# Patient Record
Sex: MALE | Race: WHITE | Employment: OTHER | URBAN - METROPOLITAN AREA
[De-identification: names, ages, dates, MRNs, and addresses within clinical notes are randomized per-mention and may not be internally consistent; named-entity substitution may affect disease eponyms.]

---

## 2017-02-07 ENCOUNTER — OFFICE VISIT (OUTPATIENT)
Dept: FAMILY MEDICINE CLINIC | Age: 67
End: 2017-02-07

## 2017-02-07 VITALS
TEMPERATURE: 100.7 F | OXYGEN SATURATION: 93 % | SYSTOLIC BLOOD PRESSURE: 132 MMHG | DIASTOLIC BLOOD PRESSURE: 82 MMHG | HEART RATE: 74 BPM | WEIGHT: 230 LBS | BODY MASS INDEX: 34.07 KG/M2 | HEIGHT: 69 IN

## 2017-02-07 DIAGNOSIS — J40 BRONCHITIS: ICD-10-CM

## 2017-02-07 DIAGNOSIS — J02.9 PHARYNGITIS, UNSPECIFIED ETIOLOGY: Primary | ICD-10-CM

## 2017-02-07 LAB
INFLUENZA A ANTIBODY: NORMAL
INFLUENZA B ANTIBODY: NORMAL
S PYO AG THROAT QL: NORMAL

## 2017-02-07 PROCEDURE — 87804 INFLUENZA ASSAY W/OPTIC: CPT | Performed by: FAMILY MEDICINE

## 2017-02-07 PROCEDURE — 87880 STREP A ASSAY W/OPTIC: CPT | Performed by: FAMILY MEDICINE

## 2017-02-07 PROCEDURE — 99213 OFFICE O/P EST LOW 20 MIN: CPT | Performed by: FAMILY MEDICINE

## 2017-02-07 RX ORDER — BENZONATATE 200 MG/1
200 CAPSULE ORAL 3 TIMES DAILY PRN
Qty: 30 CAPSULE | Refills: 1 | Status: SHIPPED | OUTPATIENT
Start: 2017-02-07 | End: 2017-03-10 | Stop reason: ALTCHOICE

## 2017-02-07 RX ORDER — AZITHROMYCIN 250 MG/1
TABLET, FILM COATED ORAL
Qty: 1 PACKET | Refills: 0 | Status: SHIPPED | OUTPATIENT
Start: 2017-02-07 | End: 2017-03-10 | Stop reason: ALTCHOICE

## 2017-02-07 ASSESSMENT — ENCOUNTER SYMPTOMS
SORE THROAT: 1
DIARRHEA: 1
COUGH: 1
WHEEZING: 0

## 2017-03-10 ENCOUNTER — OFFICE VISIT (OUTPATIENT)
Dept: FAMILY MEDICINE CLINIC | Age: 67
End: 2017-03-10

## 2017-03-10 VITALS
DIASTOLIC BLOOD PRESSURE: 82 MMHG | WEIGHT: 230 LBS | BODY MASS INDEX: 34.86 KG/M2 | SYSTOLIC BLOOD PRESSURE: 130 MMHG | HEART RATE: 74 BPM | OXYGEN SATURATION: 97 % | HEIGHT: 68 IN

## 2017-03-10 DIAGNOSIS — Z12.11 SCREENING FOR COLORECTAL CANCER: ICD-10-CM

## 2017-03-10 DIAGNOSIS — Z12.12 SCREENING FOR COLORECTAL CANCER: ICD-10-CM

## 2017-03-10 DIAGNOSIS — Z00.00 ROUTINE GENERAL MEDICAL EXAMINATION AT A HEALTH CARE FACILITY: Primary | ICD-10-CM

## 2017-03-10 PROCEDURE — 82274 ASSAY TEST FOR BLOOD FECAL: CPT | Performed by: FAMILY MEDICINE

## 2017-03-10 PROCEDURE — G0438 PPPS, INITIAL VISIT: HCPCS | Performed by: FAMILY MEDICINE

## 2017-03-10 ASSESSMENT — LIFESTYLE VARIABLES: HOW OFTEN DO YOU HAVE A DRINK CONTAINING ALCOHOL: 0

## 2017-03-10 ASSESSMENT — ANXIETY QUESTIONNAIRES: GAD7 TOTAL SCORE: 0

## 2017-03-10 ASSESSMENT — PATIENT HEALTH QUESTIONNAIRE - PHQ9: SUM OF ALL RESPONSES TO PHQ QUESTIONS 1-9: 0

## 2017-06-23 ENCOUNTER — TELEPHONE (OUTPATIENT)
Dept: OTHER | Facility: CLINIC | Age: 67
End: 2017-06-23

## 2017-06-30 ENCOUNTER — TELEPHONE (OUTPATIENT)
Dept: FAMILY MEDICINE CLINIC | Age: 67
End: 2017-06-30

## 2017-06-30 DIAGNOSIS — H57.89 CYST OF EYE: ICD-10-CM

## 2017-06-30 DIAGNOSIS — H26.9 CATARACT OF BOTH EYES: Primary | ICD-10-CM

## 2017-07-25 ENCOUNTER — TELEPHONE (OUTPATIENT)
Dept: FAMILY MEDICINE CLINIC | Age: 67
End: 2017-07-25

## 2017-07-25 NOTE — TELEPHONE ENCOUNTER
Leo Tejeda   1950    Is requesting a referral to see the following specialist:     Name of provider / Doctor: Reddjaclyn Muñoz  Phone. [phone]  Fax. [phone]  Specialty: opthamology  NPI #  If available: [id number]  TAX ID# If available: [id number]  Specialist appointment scheduled (DOS)? 6-  Primary reason / diagnosis (ICD. 10code) for the appointment: Z01.00 Routine Eye exam  Primary Insurance:    (CONFIRM insurance)

## 2017-09-05 ENCOUNTER — TELEPHONE (OUTPATIENT)
Dept: OTHER | Facility: CLINIC | Age: 67
End: 2017-09-05

## 2017-10-09 ENCOUNTER — OFFICE VISIT (OUTPATIENT)
Dept: FAMILY MEDICINE CLINIC | Age: 67
End: 2017-10-09

## 2017-10-09 VITALS
DIASTOLIC BLOOD PRESSURE: 82 MMHG | SYSTOLIC BLOOD PRESSURE: 120 MMHG | OXYGEN SATURATION: 97 % | TEMPERATURE: 99.3 F | HEART RATE: 87 BPM | BODY MASS INDEX: 35.91 KG/M2 | WEIGHT: 236.2 LBS

## 2017-10-09 DIAGNOSIS — R31.9 HEMATURIA: ICD-10-CM

## 2017-10-09 DIAGNOSIS — Z12.5 ENCOUNTER FOR SCREENING FOR MALIGNANT NEOPLASM OF PROSTATE: ICD-10-CM

## 2017-10-09 DIAGNOSIS — R33.9 URINARY RETENTION: ICD-10-CM

## 2017-10-09 DIAGNOSIS — R39.9 URINARY TRACT INFECTION SYMPTOMS: Primary | ICD-10-CM

## 2017-10-09 LAB
BILIRUBIN, POC: NORMAL
BLOOD URINE, POC: NORMAL
CLARITY, POC: CLEAR
COLOR, POC: NORMAL
GLUCOSE URINE, POC: NORMAL
HCT VFR BLD CALC: 42.4 % (ref 40.5–52.5)
HEMOGLOBIN: 14.3 G/DL (ref 13.5–17.5)
KETONES, POC: 15
LEUKOCYTE EST, POC: NORMAL
MCH RBC QN AUTO: 29.7 PG (ref 26–34)
MCHC RBC AUTO-ENTMCNC: 33.6 G/DL (ref 31–36)
MCV RBC AUTO: 88.3 FL (ref 80–100)
NITRITE, POC: POSITIVE
PDW BLD-RTO: 14.4 % (ref 12.4–15.4)
PH, POC: 6
PLATELET # BLD: 197 K/UL (ref 135–450)
PMV BLD AUTO: 8.9 FL (ref 5–10.5)
PROTEIN, POC: 100
RBC # BLD: 4.81 M/UL (ref 4.2–5.9)
SPECIFIC GRAVITY, POC: 1.03
UROBILINOGEN, POC: 2
WBC # BLD: 7.4 K/UL (ref 4–11)

## 2017-10-09 PROCEDURE — 99213 OFFICE O/P EST LOW 20 MIN: CPT | Performed by: NURSE PRACTITIONER

## 2017-10-09 PROCEDURE — 81002 URINALYSIS NONAUTO W/O SCOPE: CPT | Performed by: NURSE PRACTITIONER

## 2017-10-09 RX ORDER — TAMSULOSIN HYDROCHLORIDE 0.4 MG/1
0.4 CAPSULE ORAL DAILY
Qty: 14 CAPSULE | Refills: 0 | Status: SHIPPED | OUTPATIENT
Start: 2017-10-09 | End: 2019-05-16 | Stop reason: ALTCHOICE

## 2017-10-09 RX ORDER — PHENAZOPYRIDINE HYDROCHLORIDE 200 MG/1
200 TABLET, FILM COATED ORAL 3 TIMES DAILY PRN
COMMUNITY
Start: 2017-10-06 | End: 2017-10-09 | Stop reason: ALTCHOICE

## 2017-10-09 RX ORDER — SULFAMETHOXAZOLE AND TRIMETHOPRIM 800; 160 MG/1; MG/1
1 TABLET ORAL 2 TIMES DAILY
COMMUNITY
Start: 2017-10-06 | End: 2017-10-09 | Stop reason: ALTCHOICE

## 2017-10-09 RX ORDER — CIPROFLOXACIN 500 MG/1
500 TABLET, FILM COATED ORAL 2 TIMES DAILY
Qty: 14 TABLET | Refills: 0 | Status: SHIPPED | OUTPATIENT
Start: 2017-10-09 | End: 2017-10-16

## 2017-10-09 ASSESSMENT — ENCOUNTER SYMPTOMS
BACK PAIN: 0
DIARRHEA: 1
VOMITING: 1
NAUSEA: 1

## 2017-10-09 NOTE — PATIENT INSTRUCTIONS
Stop bactrim   Start Cipro twice daily for 7 days, if you have any side effects stop taking and call  Office  Start taking Flomax nightly  Increase oral fluids especially water    Patient Education        Urinary Retention: Care Instructions  Your Care Instructions    Urinary retention means that you aren't able to urinate. In men, it is often caused by a blockage of the urinary tract from an enlarged prostate gland. In men and women, it can also be caused by an infection or nerve damage. Or it may be a side effect of a medicine. The doctor may have drained the urine from your bladder. If you still have problems passing urine, you may need to use a catheter at home. This is used to empty your bladder until the problem can be fixed. Your doctor may put a catheter in your bladder before you go home. If so, he or she will tell you when to come back to have the catheter removed. The doctor has checked you closely. But problems can develop later. If you notice any problems or new symptoms, get medical treatment right away. Follow-up care is a key part of your treatment and safety. Be sure to make and go to all appointments, and call your doctor if you are having problems. It's also a good idea to know your test results and keep a list of the medicines you take. How can you care for yourself at home? · Take your medicines exactly as prescribed. Call your doctor if you think you are having a problem with your medicine. You will get more details on the specific medicines your doctor prescribes. · Check with your doctor before you use any over-the-counter medicines. Many cold and allergy medicines, for example, can make this problem worse. Make sure your doctor knows all of the medicines, vitamins, supplements, and herbal remedies you take. · Spread out through the day the amount of fluid you drink. Do not drink a lot at bedtime. · Avoid alcohol and caffeine.   · If you have been given a catheter, or if one is already in place, follow the instructions you were given. Always wash your hands before and after you handle the catheter. When should you call for help? Call your doctor now or seek immediate medical care if:  · You cannot urinate at all, or it is getting harder to urinate. · You have symptoms of a urinary tract infection. These may include:  ¨ Pain or burning when you urinate. ¨ A frequent need to urinate without being able to pass much urine. ¨ Pain in the flank, which is just below the rib cage and above the waist on either side of the back. ¨ Blood in your urine. ¨ A fever. Watch closely for changes in your health, and be sure to contact your doctor if:  · You have any problems with your catheter. · You do not get better as expected. Where can you learn more? Go to https://Av[employer]ink.[employer]. org and sign in to your [employer] account. Enter M244 in the [employer] box to learn more about \"Urinary Retention: Care Instructions. \"     If you do not have an account, please click on the \"Sign Up Now\" link. Current as of: August 12, 2016  Content Version: 11.3  © 6116-5909 [employer]. Care instructions adapted under license by [facility] ([facility]). If you have questions about a medical condition or this instruction, always ask your healthcare professional. [place] [address] any warranty or liability for your use of this information. Patient Education        Urinary Tract Infections in Men: Care Instructions  Your Care Instructions    A urinary tract infection, or UTI, is a general term for an infection anywhere between the kidneys and the tip of the penis. UTIs can also be a result of a prostate problem. Most cause pain or burning when you urinate. Most UTIs are caused by bacteria and can be cured with antibiotics. It is important to complete your treatment so that the infection does not get worse. Follow-up care is a key part of your treatment and safety.  Be sure swollen, red, or tender skin, or pus around the catheter. · Clean the area around the catheter with soap and water two times a day. Dry with a clean towel afterward. · Do not apply powder or lotion to the skin around the catheter. To empty the urine collection bag  · Wash your hands with soap and water. · Without touching the drain spout, remove the spout from its sleeve at the bottom of the collection bag. Open the valve on the spout. · Let the urine flow out of the bag and into the toilet or a container. Do not let the tubing or drain spout touch anything. · After you empty the bag, clean the end of the drain spout with tissue and water. Close the valve and put the drain spout back into its sleeve at the bottom of the collection bag. · Wash your hands with soap and water. When should you call for help? Call your doctor now or seek immediate medical care if:  · Symptoms such as a fever, chills, nausea, or vomiting get worse or happen for the first time. · You have new pain in your back just below your rib cage. This is called flank pain. · There is new blood or pus in your urine. · You are not able to take or keep down your antibiotics. Watch closely for changes in your health, and be sure to contact your doctor if:  · You are not getting better after taking an antibiotic for 2 days. · Your symptoms go away but then come back. Where can you learn more? Go to https://[email or web address] org and sign in to your [employer] account. Enter F442 in the [facility] box to learn more about \"Urinary Tract Infections in Men: Care Instructions. \"     If you do not have an account, please click on the \"Sign Up Now\" link. Current as of: November 28, 2016  Content Version: 11.3  © 8750-3903 [employer]. Care instructions adapted under license by [facility] ([facility]).  If you have questions about a medical condition or this instruction, always ask your healthcare professional. Jenna Gama,

## 2017-10-10 DIAGNOSIS — R31.9 URINARY TRACT INFECTION WITH HEMATURIA, SITE UNSPECIFIED: ICD-10-CM

## 2017-10-10 DIAGNOSIS — R97.20 ELEVATED PSA, GREATER THAN OR EQUAL TO 20 NG/ML: Primary | ICD-10-CM

## 2017-10-10 DIAGNOSIS — N39.0 URINARY TRACT INFECTION WITH HEMATURIA, SITE UNSPECIFIED: ICD-10-CM

## 2017-10-10 LAB
A/G RATIO: 1.2 (ref 1.1–2.2)
ALBUMIN SERPL-MCNC: 3.8 G/DL (ref 3.4–5)
ALP BLD-CCNC: 164 U/L (ref 40–129)
ALT SERPL-CCNC: 73 U/L (ref 10–40)
ANION GAP SERPL CALCULATED.3IONS-SCNC: 15 MMOL/L (ref 3–16)
AST SERPL-CCNC: 57 U/L (ref 15–37)
BILIRUB SERPL-MCNC: 0.7 MG/DL (ref 0–1)
BUN BLDV-MCNC: 19 MG/DL (ref 7–20)
CALCIUM SERPL-MCNC: 8.9 MG/DL (ref 8.3–10.6)
CHLORIDE BLD-SCNC: 99 MMOL/L (ref 99–110)
CO2: 23 MMOL/L (ref 21–32)
CREAT SERPL-MCNC: 1.2 MG/DL (ref 0.8–1.3)
GFR AFRICAN AMERICAN: >60
GFR NON-AFRICAN AMERICAN: >60
GLOBULIN: 3.3 G/DL
GLUCOSE BLD-MCNC: 91 MG/DL (ref 70–99)
POTASSIUM SERPL-SCNC: 4.1 MMOL/L (ref 3.5–5.1)
PROSTATE SPECIFIC ANTIGEN: 60.43 NG/ML (ref 0–4)
SODIUM BLD-SCNC: 137 MMOL/L (ref 136–145)
TOTAL PROTEIN: 7.1 G/DL (ref 6.4–8.2)

## 2017-10-12 LAB
ORGANISM: ABNORMAL
URINE CULTURE, ROUTINE: ABNORMAL
URINE CULTURE, ROUTINE: ABNORMAL

## 2017-10-17 ENCOUNTER — OFFICE VISIT (OUTPATIENT)
Dept: FAMILY MEDICINE CLINIC | Age: 67
End: 2017-10-17

## 2017-10-17 VITALS
TEMPERATURE: 98.8 F | DIASTOLIC BLOOD PRESSURE: 82 MMHG | BODY MASS INDEX: 36.07 KG/M2 | HEIGHT: 68 IN | HEART RATE: 78 BPM | WEIGHT: 238 LBS | OXYGEN SATURATION: 98 % | SYSTOLIC BLOOD PRESSURE: 134 MMHG | RESPIRATION RATE: 18 BRPM

## 2017-10-17 DIAGNOSIS — R31.9 URINARY TRACT INFECTION WITH HEMATURIA, SITE UNSPECIFIED: Primary | ICD-10-CM

## 2017-10-17 DIAGNOSIS — R74.8 ELEVATED LIVER ENZYMES: ICD-10-CM

## 2017-10-17 DIAGNOSIS — N39.0 URINARY TRACT INFECTION WITH HEMATURIA, SITE UNSPECIFIED: Primary | ICD-10-CM

## 2017-10-17 LAB
BILIRUBIN, POC: ABNORMAL
BLOOD URINE, POC: ABNORMAL
CLARITY, POC: CLEAR
COLOR, POC: YELLOW
GLUCOSE URINE, POC: ABNORMAL
KETONES, POC: ABNORMAL
LEUKOCYTE EST, POC: ABNORMAL
NITRITE, POC: ABNORMAL
PH, POC: 5.5
PROTEIN, POC: ABNORMAL
SPECIFIC GRAVITY, POC: 1.02
UROBILINOGEN, POC: 0.2

## 2017-10-17 PROCEDURE — 1036F TOBACCO NON-USER: CPT | Performed by: NURSE PRACTITIONER

## 2017-10-17 PROCEDURE — 3017F COLORECTAL CA SCREEN DOC REV: CPT | Performed by: NURSE PRACTITIONER

## 2017-10-17 PROCEDURE — 99213 OFFICE O/P EST LOW 20 MIN: CPT | Performed by: NURSE PRACTITIONER

## 2017-10-17 PROCEDURE — G8427 DOCREV CUR MEDS BY ELIG CLIN: HCPCS | Performed by: NURSE PRACTITIONER

## 2017-10-17 PROCEDURE — G8417 CALC BMI ABV UP PARAM F/U: HCPCS | Performed by: NURSE PRACTITIONER

## 2017-10-17 PROCEDURE — 4040F PNEUMOC VAC/ADMIN/RCVD: CPT | Performed by: NURSE PRACTITIONER

## 2017-10-17 PROCEDURE — G8484 FLU IMMUNIZE NO ADMIN: HCPCS | Performed by: NURSE PRACTITIONER

## 2017-10-17 PROCEDURE — 81002 URINALYSIS NONAUTO W/O SCOPE: CPT | Performed by: NURSE PRACTITIONER

## 2017-10-17 PROCEDURE — 1123F ACP DISCUSS/DSCN MKR DOCD: CPT | Performed by: NURSE PRACTITIONER

## 2017-10-17 ASSESSMENT — ENCOUNTER SYMPTOMS
DIARRHEA: 0
NAUSEA: 0
RESPIRATORY NEGATIVE: 1
GASTROINTESTINAL NEGATIVE: 1
ABDOMINAL PAIN: 0
EYES NEGATIVE: 1
VOMITING: 0

## 2017-10-17 NOTE — PROGRESS NOTES
Unknown   NEG          Assessment/Plan   1. Urinary tract infection with hematuria, site unspecified  POCT Urinalysis no Micro   2. Elevated liver enzymes  Hepatic Function Panel   Elevated PSA-  Keep appointment with Dr. Patrica Bower this week  Elevated liver enzymes-  Schedule a lab only visit in 4-6 weeks to re-check liver profile, follow up with Dr. Stephanie Cates.      Return to office for worsening symptoms, fever   To emergency room for severe symptoms

## 2017-11-28 ENCOUNTER — OFFICE VISIT (OUTPATIENT)
Dept: FAMILY MEDICINE CLINIC | Age: 67
End: 2017-11-28

## 2017-11-28 VITALS
BODY MASS INDEX: 36.8 KG/M2 | SYSTOLIC BLOOD PRESSURE: 130 MMHG | HEART RATE: 83 BPM | DIASTOLIC BLOOD PRESSURE: 84 MMHG | OXYGEN SATURATION: 96 % | WEIGHT: 242 LBS

## 2017-11-28 DIAGNOSIS — R31.9 URINARY TRACT INFECTION WITH HEMATURIA, SITE UNSPECIFIED: ICD-10-CM

## 2017-11-28 DIAGNOSIS — R79.89 ELEVATED LIVER FUNCTION TESTS: Primary | ICD-10-CM

## 2017-11-28 DIAGNOSIS — Z12.11 SCREENING FOR COLON CANCER: ICD-10-CM

## 2017-11-28 DIAGNOSIS — N39.0 URINARY TRACT INFECTION WITH HEMATURIA, SITE UNSPECIFIED: ICD-10-CM

## 2017-11-28 PROCEDURE — G8417 CALC BMI ABV UP PARAM F/U: HCPCS | Performed by: FAMILY MEDICINE

## 2017-11-28 PROCEDURE — 4040F PNEUMOC VAC/ADMIN/RCVD: CPT | Performed by: FAMILY MEDICINE

## 2017-11-28 PROCEDURE — 36415 COLL VENOUS BLD VENIPUNCTURE: CPT | Performed by: FAMILY MEDICINE

## 2017-11-28 PROCEDURE — G8427 DOCREV CUR MEDS BY ELIG CLIN: HCPCS | Performed by: FAMILY MEDICINE

## 2017-11-28 PROCEDURE — 1123F ACP DISCUSS/DSCN MKR DOCD: CPT | Performed by: FAMILY MEDICINE

## 2017-11-28 PROCEDURE — G8484 FLU IMMUNIZE NO ADMIN: HCPCS | Performed by: FAMILY MEDICINE

## 2017-11-28 PROCEDURE — 99213 OFFICE O/P EST LOW 20 MIN: CPT | Performed by: FAMILY MEDICINE

## 2017-11-28 PROCEDURE — 1036F TOBACCO NON-USER: CPT | Performed by: FAMILY MEDICINE

## 2017-11-28 PROCEDURE — 3017F COLORECTAL CA SCREEN DOC REV: CPT | Performed by: FAMILY MEDICINE

## 2017-11-28 RX ORDER — BRIMONIDINE TARTRATE 2 MG/ML
1 SOLUTION/ DROPS OPHTHALMIC 2 TIMES DAILY
COMMUNITY
End: 2018-03-16 | Stop reason: ALTCHOICE

## 2017-11-28 ASSESSMENT — ENCOUNTER SYMPTOMS: ABDOMINAL PAIN: 0

## 2017-11-28 NOTE — PROGRESS NOTES
Evangelista Barbour is a 79 y.o. male    Chief Complaint   Patient presents with    Other     Thinks he is doing somewhat better, still seeing urologist and taking flomax. PSA  was recently 13, they would like it below 4        HPI:    HPI    Patient here for a f/u on UTI. Found to have Klebsiella UTI, sensitive to Cipro. He felt much better with the Cipro. No dysuria, frequency or hematuria. No chronic urinary symptoms. Still taking Flomax. His PSA has gone from 60 to 15 and the urologist would like it to be under 4. No Family history of prostate cancer. Also found to have elevated LFT's. No abdominal pain or jaundice. ROS:    Review of Systems   Gastrointestinal: Negative for abdominal pain. Genitourinary: Negative for dysuria, hematuria and urgency. /84 (Site: Left Arm, Position: Sitting, Cuff Size: Large Adult)   Pulse 83   Wt 242 lb (109.8 kg)   SpO2 96%   BMI 36.80 kg/m²     Physical Exam:    Physical Exam   Constitutional: He appears well-developed. No distress. Skin: He is not diaphoretic. Psychiatric: He has a normal mood and affect. Current Outpatient Prescriptions   Medication Sig Dispense Refill    brimonidine (ALPHAGAN) 0.2 % ophthalmic solution Place 1 drop into the right eye 2 times daily      simvastatin (ZOCOR) 40 MG tablet TAKE 1 TABLET BY MOUTH EVERY EVENING 90 tablet 3    tamsulosin (FLOMAX) 0.4 MG capsule Take 1 capsule by mouth daily for 14 days 14 capsule 0     No current facility-administered medications for this visit. Assessment:    1. Elevated liver function tests    2. Urinary tract infection with hematuria, site unspecified    3. Screening for colon cancer        Plan:    1. Elevated liver function tests  Recheck liver enzymes. - Comprehensive Metabolic Panel    2. Urinary tract infection with hematuria, site unspecified  Resolved. Took Cipro for 2 weeks. Will see Urology in 1 month for f/u on PSA.      3. Screening for colon cancer  - POCT Fecal

## 2017-11-29 LAB
A/G RATIO: 1.5 (ref 1.1–2.2)
ALBUMIN SERPL-MCNC: 4.5 G/DL (ref 3.4–5)
ALP BLD-CCNC: 73 U/L (ref 40–129)
ALT SERPL-CCNC: 30 U/L (ref 10–40)
ANION GAP SERPL CALCULATED.3IONS-SCNC: 16 MMOL/L (ref 3–16)
AST SERPL-CCNC: 25 U/L (ref 15–37)
BILIRUB SERPL-MCNC: 0.5 MG/DL (ref 0–1)
BUN BLDV-MCNC: 15 MG/DL (ref 7–20)
CALCIUM SERPL-MCNC: 9.4 MG/DL (ref 8.3–10.6)
CHLORIDE BLD-SCNC: 99 MMOL/L (ref 99–110)
CO2: 26 MMOL/L (ref 21–32)
CREAT SERPL-MCNC: 1 MG/DL (ref 0.8–1.3)
GFR AFRICAN AMERICAN: >60
GFR NON-AFRICAN AMERICAN: >60
GLOBULIN: 3 G/DL
GLUCOSE BLD-MCNC: 94 MG/DL (ref 70–99)
POTASSIUM SERPL-SCNC: 4.1 MMOL/L (ref 3.5–5.1)
SODIUM BLD-SCNC: 141 MMOL/L (ref 136–145)
TOTAL PROTEIN: 7.5 G/DL (ref 6.4–8.2)

## 2017-12-11 ENCOUNTER — NURSE ONLY (OUTPATIENT)
Dept: FAMILY MEDICINE CLINIC | Age: 67
End: 2017-12-11

## 2017-12-11 DIAGNOSIS — Z12.11 SCREENING FOR COLON CANCER: ICD-10-CM

## 2017-12-11 LAB
CONTROL: NORMAL
HEMOCCULT STL QL: NORMAL

## 2017-12-11 PROCEDURE — 82274 ASSAY TEST FOR BLOOD FECAL: CPT | Performed by: FAMILY MEDICINE

## 2018-01-05 LAB — PROSTATE SPECIFIC ANTIGEN: 13.77 NG/ML

## 2018-03-16 ENCOUNTER — OFFICE VISIT (OUTPATIENT)
Dept: FAMILY MEDICINE CLINIC | Age: 68
End: 2018-03-16

## 2018-03-16 VITALS
SYSTOLIC BLOOD PRESSURE: 122 MMHG | HEIGHT: 68 IN | BODY MASS INDEX: 37.13 KG/M2 | OXYGEN SATURATION: 97 % | DIASTOLIC BLOOD PRESSURE: 80 MMHG | HEART RATE: 79 BPM | WEIGHT: 245 LBS

## 2018-03-16 DIAGNOSIS — E78.00 PURE HYPERCHOLESTEROLEMIA: ICD-10-CM

## 2018-03-16 DIAGNOSIS — Z01.818 PREOPERATIVE EXAMINATION: Primary | ICD-10-CM

## 2018-03-16 DIAGNOSIS — I45.10 RBBB: ICD-10-CM

## 2018-03-16 LAB
A/G RATIO: 1.6 (ref 1.1–2.2)
ALBUMIN SERPL-MCNC: 4.5 G/DL (ref 3.4–5)
ALP BLD-CCNC: 59 U/L (ref 40–129)
ALT SERPL-CCNC: 20 U/L (ref 10–40)
ANION GAP SERPL CALCULATED.3IONS-SCNC: 15 MMOL/L (ref 3–16)
AST SERPL-CCNC: 18 U/L (ref 15–37)
BASOPHILS ABSOLUTE: 0 K/UL (ref 0–0.2)
BASOPHILS RELATIVE PERCENT: 0.7 %
BILIRUB SERPL-MCNC: 0.4 MG/DL (ref 0–1)
BUN BLDV-MCNC: 20 MG/DL (ref 7–20)
CALCIUM SERPL-MCNC: 9 MG/DL (ref 8.3–10.6)
CHLORIDE BLD-SCNC: 105 MMOL/L (ref 99–110)
CHOLESTEROL, TOTAL: 160 MG/DL (ref 0–199)
CO2: 26 MMOL/L (ref 21–32)
CREAT SERPL-MCNC: 1 MG/DL (ref 0.8–1.3)
EOSINOPHILS ABSOLUTE: 0.2 K/UL (ref 0–0.6)
EOSINOPHILS RELATIVE PERCENT: 3.1 %
GFR AFRICAN AMERICAN: >60
GFR NON-AFRICAN AMERICAN: >60
GLOBULIN: 2.8 G/DL
GLUCOSE BLD-MCNC: 101 MG/DL (ref 70–99)
HCT VFR BLD CALC: 42.3 % (ref 40.5–52.5)
HDLC SERPL-MCNC: 46 MG/DL (ref 40–60)
HEMOGLOBIN: 14.2 G/DL (ref 13.5–17.5)
LDL CHOLESTEROL CALCULATED: 92 MG/DL
LYMPHOCYTES ABSOLUTE: 1.1 K/UL (ref 1–5.1)
LYMPHOCYTES RELATIVE PERCENT: 21.4 %
MCH RBC QN AUTO: 29.7 PG (ref 26–34)
MCHC RBC AUTO-ENTMCNC: 33.6 G/DL (ref 31–36)
MCV RBC AUTO: 88.4 FL (ref 80–100)
MONOCYTES ABSOLUTE: 0.3 K/UL (ref 0–1.3)
MONOCYTES RELATIVE PERCENT: 6.3 %
NEUTROPHILS ABSOLUTE: 3.5 K/UL (ref 1.7–7.7)
NEUTROPHILS RELATIVE PERCENT: 68.5 %
PDW BLD-RTO: 14 % (ref 12.4–15.4)
PLATELET # BLD: 192 K/UL (ref 135–450)
PMV BLD AUTO: 9.4 FL (ref 5–10.5)
POTASSIUM SERPL-SCNC: 4.8 MMOL/L (ref 3.5–5.1)
RBC # BLD: 4.79 M/UL (ref 4.2–5.9)
SODIUM BLD-SCNC: 146 MMOL/L (ref 136–145)
TOTAL PROTEIN: 7.3 G/DL (ref 6.4–8.2)
TRIGL SERPL-MCNC: 108 MG/DL (ref 0–150)
VLDLC SERPL CALC-MCNC: 22 MG/DL
WBC # BLD: 5.1 K/UL (ref 4–11)

## 2018-03-16 PROCEDURE — 36415 COLL VENOUS BLD VENIPUNCTURE: CPT | Performed by: FAMILY MEDICINE

## 2018-03-16 PROCEDURE — 93000 ELECTROCARDIOGRAM COMPLETE: CPT | Performed by: FAMILY MEDICINE

## 2018-03-16 PROCEDURE — 3017F COLORECTAL CA SCREEN DOC REV: CPT | Performed by: FAMILY MEDICINE

## 2018-03-16 PROCEDURE — G8484 FLU IMMUNIZE NO ADMIN: HCPCS | Performed by: FAMILY MEDICINE

## 2018-03-16 PROCEDURE — G8427 DOCREV CUR MEDS BY ELIG CLIN: HCPCS | Performed by: FAMILY MEDICINE

## 2018-03-16 PROCEDURE — G8417 CALC BMI ABV UP PARAM F/U: HCPCS | Performed by: FAMILY MEDICINE

## 2018-03-16 PROCEDURE — 4040F PNEUMOC VAC/ADMIN/RCVD: CPT | Performed by: FAMILY MEDICINE

## 2018-03-16 PROCEDURE — 99214 OFFICE O/P EST MOD 30 MIN: CPT | Performed by: FAMILY MEDICINE

## 2018-03-16 PROCEDURE — 1123F ACP DISCUSS/DSCN MKR DOCD: CPT | Performed by: FAMILY MEDICINE

## 2018-03-16 PROCEDURE — 1036F TOBACCO NON-USER: CPT | Performed by: FAMILY MEDICINE

## 2018-03-16 ASSESSMENT — PATIENT HEALTH QUESTIONNAIRE - PHQ9
2. FEELING DOWN, DEPRESSED OR HOPELESS: 0
SUM OF ALL RESPONSES TO PHQ9 QUESTIONS 1 & 2: 0
1. LITTLE INTEREST OR PLEASURE IN DOING THINGS: 0
SUM OF ALL RESPONSES TO PHQ QUESTIONS 1-9: 0

## 2018-03-16 NOTE — PROGRESS NOTES
N/A    Years of education: N/A     Occupational History    Not on file. Social History Main Topics    Smoking status: Never Smoker    Smokeless tobacco: Never Used    Alcohol use No    Drug use: No    Sexual activity: Not on file     Other Topics Concern    Not on file     Social History Narrative    No narrative on file       Review of Systems  A comprehensive review of systems was negative except for what was noted in the HPI. Physical Exam   Constitutional: He is oriented to person, place, and time. He appears well-developed and well-nourished. No distress. HENT:   Head: Normocephalic and atraumatic. Mouth/Throat: Uvula is midline, oropharynx is clear and moist and mucous membranes are normal.   Eyes: Conjunctivae and EOM are normal. Pupils are equal, round, and reactive to light. Neck: Trachea normal and normal range of motion. Neck supple. No JVD present. No mass and no thyromegaly present. Cardiovascular: Normal rate, regular rhythm, normal heart sounds and intact distal pulses. Exam reveals no gallop and no friction rub. No murmur heard. Pulmonary/Chest: Effort normal and breath sounds normal. No respiratory distress. He has no wheezes. He has no rales. Abdominal: Soft. Normal aorta and bowel sounds are normal. He exhibits no distension and no mass. There is no hepatosplenomegaly. No tenderness. Musculoskeletal: He exhibits no edema and no tenderness. Neurological: He is alert and oriented to person, place, and time. He has normal strength. No cranial nerve deficit or sensory deficit. Coordination and gait normal.   Skin: Skin is warm and dry. No rash noted. No erythema. Psychiatric: He has a normal mood and affect. His behavior is normal.     EKG Interpretation:  normal sinus rhythm, RBBB, there are no previous tracings available for comparison.     Lab Review   Lab Results   Component Value Date     03/16/2018    K 4.8 03/16/2018     03/16/2018    CO2 26

## 2018-06-19 ENCOUNTER — OFFICE VISIT (OUTPATIENT)
Dept: FAMILY MEDICINE CLINIC | Age: 68
End: 2018-06-19

## 2018-06-19 VITALS
BODY MASS INDEX: 36.58 KG/M2 | HEIGHT: 69 IN | HEART RATE: 74 BPM | SYSTOLIC BLOOD PRESSURE: 102 MMHG | WEIGHT: 247 LBS | DIASTOLIC BLOOD PRESSURE: 78 MMHG | OXYGEN SATURATION: 97 %

## 2018-06-19 DIAGNOSIS — E78.5 HYPERLIPIDEMIA, UNSPECIFIED HYPERLIPIDEMIA TYPE: ICD-10-CM

## 2018-06-19 DIAGNOSIS — R73.9 HYPERGLYCEMIA: ICD-10-CM

## 2018-06-19 DIAGNOSIS — Z00.00 ROUTINE GENERAL MEDICAL EXAMINATION AT A HEALTH CARE FACILITY: Primary | ICD-10-CM

## 2018-06-19 DIAGNOSIS — R97.20 ELEVATED PSA: ICD-10-CM

## 2018-06-19 LAB
HBA1C MFR BLD: 5.9 %
PROSTATE SPECIFIC ANTIGEN: 6.96 NG/ML (ref 0–4)

## 2018-06-19 PROCEDURE — 83036 HEMOGLOBIN GLYCOSYLATED A1C: CPT | Performed by: FAMILY MEDICINE

## 2018-06-19 PROCEDURE — 36415 COLL VENOUS BLD VENIPUNCTURE: CPT | Performed by: FAMILY MEDICINE

## 2018-06-19 PROCEDURE — 4040F PNEUMOC VAC/ADMIN/RCVD: CPT | Performed by: FAMILY MEDICINE

## 2018-06-19 PROCEDURE — G0439 PPPS, SUBSEQ VISIT: HCPCS | Performed by: FAMILY MEDICINE

## 2018-06-19 RX ORDER — SIMVASTATIN 40 MG
TABLET ORAL
Qty: 90 TABLET | Refills: 3 | Status: SHIPPED | OUTPATIENT
Start: 2018-06-19 | End: 2018-11-16

## 2018-06-19 ASSESSMENT — PATIENT HEALTH QUESTIONNAIRE - PHQ9: SUM OF ALL RESPONSES TO PHQ QUESTIONS 1-9: 0

## 2018-06-19 ASSESSMENT — ANXIETY QUESTIONNAIRES: GAD7 TOTAL SCORE: 0

## 2018-06-19 ASSESSMENT — LIFESTYLE VARIABLES: HOW OFTEN DO YOU HAVE A DRINK CONTAINING ALCOHOL: 0

## 2018-08-31 ENCOUNTER — OFFICE VISIT (OUTPATIENT)
Dept: FAMILY MEDICINE CLINIC | Age: 68
End: 2018-08-31

## 2018-08-31 VITALS
DIASTOLIC BLOOD PRESSURE: 84 MMHG | SYSTOLIC BLOOD PRESSURE: 132 MMHG | WEIGHT: 250 LBS | OXYGEN SATURATION: 96 % | HEART RATE: 66 BPM | BODY MASS INDEX: 37.46 KG/M2

## 2018-08-31 DIAGNOSIS — M79.89 SWELLING OF BOTH LOWER EXTREMITIES: Primary | ICD-10-CM

## 2018-08-31 DIAGNOSIS — M79.89 SWELLING OF BOTH LOWER EXTREMITIES: ICD-10-CM

## 2018-08-31 PROCEDURE — 1123F ACP DISCUSS/DSCN MKR DOCD: CPT | Performed by: FAMILY MEDICINE

## 2018-08-31 PROCEDURE — 99213 OFFICE O/P EST LOW 20 MIN: CPT | Performed by: FAMILY MEDICINE

## 2018-08-31 PROCEDURE — 4040F PNEUMOC VAC/ADMIN/RCVD: CPT | Performed by: FAMILY MEDICINE

## 2018-08-31 PROCEDURE — G8427 DOCREV CUR MEDS BY ELIG CLIN: HCPCS | Performed by: FAMILY MEDICINE

## 2018-08-31 PROCEDURE — 3017F COLORECTAL CA SCREEN DOC REV: CPT | Performed by: FAMILY MEDICINE

## 2018-08-31 PROCEDURE — 1101F PT FALLS ASSESS-DOCD LE1/YR: CPT | Performed by: FAMILY MEDICINE

## 2018-08-31 PROCEDURE — G8417 CALC BMI ABV UP PARAM F/U: HCPCS | Performed by: FAMILY MEDICINE

## 2018-08-31 PROCEDURE — 1036F TOBACCO NON-USER: CPT | Performed by: FAMILY MEDICINE

## 2018-08-31 RX ORDER — FUROSEMIDE 20 MG/1
20 TABLET ORAL DAILY
Qty: 30 TABLET | Refills: 0 | Status: ON HOLD | OUTPATIENT
Start: 2018-08-31 | End: 2019-01-21

## 2018-08-31 RX ORDER — FUROSEMIDE 20 MG/1
TABLET ORAL
Qty: 90 TABLET | Refills: 0 | OUTPATIENT
Start: 2018-08-31

## 2018-11-15 DIAGNOSIS — E78.5 HYPERLIPIDEMIA, UNSPECIFIED HYPERLIPIDEMIA TYPE: ICD-10-CM

## 2018-11-16 RX ORDER — SIMVASTATIN 40 MG
TABLET ORAL
Qty: 90 TABLET | Refills: 0 | Status: SHIPPED | OUTPATIENT
Start: 2018-11-16 | End: 2018-12-14 | Stop reason: SDUPTHER

## 2018-12-14 ENCOUNTER — OFFICE VISIT (OUTPATIENT)
Dept: FAMILY MEDICINE CLINIC | Age: 68
End: 2018-12-14
Payer: MEDICARE

## 2018-12-14 VITALS
WEIGHT: 246 LBS | DIASTOLIC BLOOD PRESSURE: 82 MMHG | OXYGEN SATURATION: 98 % | SYSTOLIC BLOOD PRESSURE: 124 MMHG | BODY MASS INDEX: 36.86 KG/M2 | HEART RATE: 76 BPM

## 2018-12-14 DIAGNOSIS — R00.2 PALPITATIONS: ICD-10-CM

## 2018-12-14 DIAGNOSIS — E78.5 HYPERLIPIDEMIA, UNSPECIFIED HYPERLIPIDEMIA TYPE: ICD-10-CM

## 2018-12-14 DIAGNOSIS — R97.20 ELEVATED PSA: Primary | ICD-10-CM

## 2018-12-14 DIAGNOSIS — Z23 NEED FOR INFLUENZA VACCINATION: ICD-10-CM

## 2018-12-14 DIAGNOSIS — Z12.11 SCREENING FOR COLON CANCER: ICD-10-CM

## 2018-12-14 DIAGNOSIS — Z23 NEED FOR PNEUMOCOCCAL VACCINATION: ICD-10-CM

## 2018-12-14 DIAGNOSIS — R68.83 CHILLS: ICD-10-CM

## 2018-12-14 LAB
BASOPHILS ABSOLUTE: 0.1 K/UL (ref 0–0.2)
BASOPHILS RELATIVE PERCENT: 1 %
EOSINOPHILS ABSOLUTE: 0.5 K/UL (ref 0–0.6)
EOSINOPHILS RELATIVE PERCENT: 8.4 %
HCT VFR BLD CALC: 42.6 % (ref 40.5–52.5)
HEMOGLOBIN: 14.3 G/DL (ref 13.5–17.5)
LYMPHOCYTES ABSOLUTE: 1.1 K/UL (ref 1–5.1)
LYMPHOCYTES RELATIVE PERCENT: 20.9 %
MCH RBC QN AUTO: 29.4 PG (ref 26–34)
MCHC RBC AUTO-ENTMCNC: 33.4 G/DL (ref 31–36)
MCV RBC AUTO: 87.9 FL (ref 80–100)
MONOCYTES ABSOLUTE: 0.3 K/UL (ref 0–1.3)
MONOCYTES RELATIVE PERCENT: 5.7 %
NEUTROPHILS ABSOLUTE: 3.5 K/UL (ref 1.7–7.7)
NEUTROPHILS RELATIVE PERCENT: 64 %
PDW BLD-RTO: 14.2 % (ref 12.4–15.4)
PLATELET # BLD: 198 K/UL (ref 135–450)
PMV BLD AUTO: 9 FL (ref 5–10.5)
RBC # BLD: 4.85 M/UL (ref 4.2–5.9)
WBC # BLD: 5.4 K/UL (ref 4–11)

## 2018-12-14 PROCEDURE — 4040F PNEUMOC VAC/ADMIN/RCVD: CPT | Performed by: FAMILY MEDICINE

## 2018-12-14 PROCEDURE — G0008 ADMIN INFLUENZA VIRUS VAC: HCPCS | Performed by: FAMILY MEDICINE

## 2018-12-14 PROCEDURE — G8417 CALC BMI ABV UP PARAM F/U: HCPCS | Performed by: FAMILY MEDICINE

## 2018-12-14 PROCEDURE — G8427 DOCREV CUR MEDS BY ELIG CLIN: HCPCS | Performed by: FAMILY MEDICINE

## 2018-12-14 PROCEDURE — 3017F COLORECTAL CA SCREEN DOC REV: CPT | Performed by: FAMILY MEDICINE

## 2018-12-14 PROCEDURE — 90662 IIV NO PRSV INCREASED AG IM: CPT | Performed by: FAMILY MEDICINE

## 2018-12-14 PROCEDURE — 90670 PCV13 VACCINE IM: CPT | Performed by: FAMILY MEDICINE

## 2018-12-14 PROCEDURE — 99214 OFFICE O/P EST MOD 30 MIN: CPT | Performed by: FAMILY MEDICINE

## 2018-12-14 PROCEDURE — 93000 ELECTROCARDIOGRAM COMPLETE: CPT | Performed by: FAMILY MEDICINE

## 2018-12-14 PROCEDURE — 1123F ACP DISCUSS/DSCN MKR DOCD: CPT | Performed by: FAMILY MEDICINE

## 2018-12-14 PROCEDURE — G0009 ADMIN PNEUMOCOCCAL VACCINE: HCPCS | Performed by: FAMILY MEDICINE

## 2018-12-14 PROCEDURE — G8482 FLU IMMUNIZE ORDER/ADMIN: HCPCS | Performed by: FAMILY MEDICINE

## 2018-12-14 PROCEDURE — 1101F PT FALLS ASSESS-DOCD LE1/YR: CPT | Performed by: FAMILY MEDICINE

## 2018-12-14 PROCEDURE — 1036F TOBACCO NON-USER: CPT | Performed by: FAMILY MEDICINE

## 2018-12-14 RX ORDER — SIMVASTATIN 40 MG
TABLET ORAL
Qty: 90 TABLET | Refills: 1 | Status: SHIPPED | OUTPATIENT
Start: 2018-12-14 | End: 2019-02-14 | Stop reason: SDUPTHER

## 2018-12-15 LAB
A/G RATIO: 1.4 (ref 1.1–2.2)
ALBUMIN SERPL-MCNC: 4.3 G/DL (ref 3.4–5)
ALP BLD-CCNC: 57 U/L (ref 40–129)
ALT SERPL-CCNC: 22 U/L (ref 10–40)
ANION GAP SERPL CALCULATED.3IONS-SCNC: 16 MMOL/L (ref 3–16)
AST SERPL-CCNC: 21 U/L (ref 15–37)
BILIRUB SERPL-MCNC: 0.6 MG/DL (ref 0–1)
BUN BLDV-MCNC: 15 MG/DL (ref 7–20)
CALCIUM SERPL-MCNC: 9.5 MG/DL (ref 8.3–10.6)
CHLORIDE BLD-SCNC: 104 MMOL/L (ref 99–110)
CO2: 22 MMOL/L (ref 21–32)
CREAT SERPL-MCNC: 1 MG/DL (ref 0.8–1.3)
GFR AFRICAN AMERICAN: >60
GFR NON-AFRICAN AMERICAN: >60
GLOBULIN: 3 G/DL
GLUCOSE BLD-MCNC: 105 MG/DL (ref 70–99)
POTASSIUM SERPL-SCNC: 4.4 MMOL/L (ref 3.5–5.1)
PROSTATE SPECIFIC ANTIGEN: 4.91 NG/ML (ref 0–4)
SODIUM BLD-SCNC: 142 MMOL/L (ref 136–145)
TOTAL PROTEIN: 7.3 G/DL (ref 6.4–8.2)
TSH REFLEX: 2.57 UIU/ML (ref 0.27–4.2)

## 2018-12-26 DIAGNOSIS — Z12.11 SCREEN FOR COLON CANCER: Primary | ICD-10-CM

## 2018-12-26 RX ORDER — POLYETHYLENE GLYCOL 3350 17 G/17G
POWDER, FOR SOLUTION ORAL
Qty: 238 G | Refills: 0 | Status: ON HOLD | OUTPATIENT
Start: 2018-12-26 | End: 2019-01-21 | Stop reason: HOSPADM

## 2018-12-28 DIAGNOSIS — Z12.11 SCREENING FOR COLON CANCER: Primary | ICD-10-CM

## 2018-12-28 RX ORDER — POLYETHYLENE GLYCOL 3350 17 G/17G
238 POWDER, FOR SOLUTION ORAL ONCE
Qty: 238 G | Refills: 0 | Status: SHIPPED | OUTPATIENT
Start: 2018-12-28 | End: 2018-12-28

## 2018-12-28 NOTE — LETTER
have received instructions regarding if and when to discontinue the medication. If you have not, or do not clearly understand the instructions, please call the office for clarification (number listed above). 5. Drink plenty of fluid. 1 day prior to your procedure:  1. Do not eat any SOLID FOOD, beginning with breakfast drink clear liquids only, which includes: Chicken or Beef Broth, Coffee/tea (without milk or creamer) Gatorade/PowerAde (no red or purple), JeIl-O (no red or purple), All Soda (even dark cola), Sorbet/Popsicles (no red or purple), Water If you take Diabetic medications (insulin/oral medication)-reduce the amount by one half on the morning of prep. You may resume the meds once you begin eating again. You must drink 8oz of liquids every hour to avoid dehydration. 2. If you take Diabetic medications (insulin/oral medication) - reduce the amount by one half on morning of prep. You may resume the meds once you begin eating again. 3. Bowel Cleansing Prep   3:00pm Take 4 dulcolax tablets with a full glass of water   5:00pm Mix one bottle of Miralax (238gm) in one bottle of Gatorade (64oz). Shake until dissolved. Drink 8 oz every 15 minutes until you have finished half of the solution. MORNING OF PROCEDURE  1. __4:10AM__ (5 hours prior to the procedure) Drink the remaining portion of the solution 8 oz. every 15 minutes until completely finished, followed by 8 oz of any of the approved liquids. 2. Take your Blood pressure, Heart and Seizure medication the morning of the procedure with sips of water. 3. Bring inhalers with you. 4. Do not take your Diabetic medication the morning of procedure. Dear Patient,      Kathe Washington will receive a call from the [id number] pre-registration department prior to your GI procedure. This will help streamline your check-in process on the day of service. During this call a skilled associate will review your demographic and insurance benefits information.

## 2019-01-18 ENCOUNTER — TELEPHONE (OUTPATIENT)
Dept: GASTROENTEROLOGY | Age: 69
End: 2019-01-18

## 2019-01-21 ENCOUNTER — TELEPHONE (OUTPATIENT)
Dept: GASTROENTEROLOGY | Age: 69
End: 2019-01-21

## 2019-01-21 ENCOUNTER — HOSPITAL ENCOUNTER (OUTPATIENT)
Age: 69
Setting detail: OUTPATIENT SURGERY
Discharge: HOME | End: 2019-01-21
Attending: INTERNAL MEDICINE | Admitting: INTERNAL MEDICINE
Payer: MEDICARE

## 2019-01-21 VITALS
OXYGEN SATURATION: 100 % | DIASTOLIC BLOOD PRESSURE: 68 MMHG | TEMPERATURE: 97.4 F | WEIGHT: 242 LBS | BODY MASS INDEX: 35.84 KG/M2 | SYSTOLIC BLOOD PRESSURE: 132 MMHG | RESPIRATION RATE: 16 BRPM | HEIGHT: 69 IN | HEART RATE: 64 BPM

## 2019-01-21 PROCEDURE — G0121 COLON CA SCRN NOT HI RSK IND: HCPCS | Performed by: INTERNAL MEDICINE

## 2019-01-21 PROCEDURE — 2580000003 HC RX 258: Performed by: INTERNAL MEDICINE

## 2019-01-21 PROCEDURE — 7100000011 HC PHASE II RECOVERY - ADDTL 15 MIN: Performed by: INTERNAL MEDICINE

## 2019-01-21 PROCEDURE — 99152 MOD SED SAME PHYS/QHP 5/>YRS: CPT | Performed by: INTERNAL MEDICINE

## 2019-01-21 PROCEDURE — 3609027000 HC COLONOSCOPY: Performed by: INTERNAL MEDICINE

## 2019-01-21 PROCEDURE — 2709999900 HC NON-CHARGEABLE SUPPLY: Performed by: INTERNAL MEDICINE

## 2019-01-21 PROCEDURE — 6360000002 HC RX W HCPCS: Performed by: INTERNAL MEDICINE

## 2019-01-21 PROCEDURE — 7100000010 HC PHASE II RECOVERY - FIRST 15 MIN: Performed by: INTERNAL MEDICINE

## 2019-01-21 RX ORDER — FENTANYL CITRATE 50 UG/ML
INJECTION, SOLUTION INTRAMUSCULAR; INTRAVENOUS PRN
Status: DISCONTINUED | OUTPATIENT
Start: 2019-01-21 | End: 2019-01-21 | Stop reason: HOSPADM

## 2019-01-21 RX ORDER — SODIUM CHLORIDE, SODIUM LACTATE, POTASSIUM CHLORIDE, CALCIUM CHLORIDE 600; 310; 30; 20 MG/100ML; MG/100ML; MG/100ML; MG/100ML
1000 INJECTION, SOLUTION INTRAVENOUS ONCE
Status: COMPLETED | OUTPATIENT
Start: 2019-01-21 | End: 2019-01-21

## 2019-01-21 RX ORDER — MIDAZOLAM HYDROCHLORIDE 5 MG/ML
INJECTION INTRAMUSCULAR; INTRAVENOUS PRN
Status: DISCONTINUED | OUTPATIENT
Start: 2019-01-21 | End: 2019-01-21 | Stop reason: HOSPADM

## 2019-01-21 RX ORDER — SODIUM CHLORIDE, SODIUM LACTATE, POTASSIUM CHLORIDE, CALCIUM CHLORIDE 600; 310; 30; 20 MG/100ML; MG/100ML; MG/100ML; MG/100ML
INJECTION, SOLUTION INTRAVENOUS CONTINUOUS PRN
Status: DISCONTINUED | OUTPATIENT
Start: 2019-01-21 | End: 2019-01-21 | Stop reason: HOSPADM

## 2019-01-21 RX ADMIN — SODIUM CHLORIDE, POTASSIUM CHLORIDE, SODIUM LACTATE AND CALCIUM CHLORIDE 1000 ML: 600; 310; 30; 20 INJECTION, SOLUTION INTRAVENOUS at 08:29

## 2019-01-21 ASSESSMENT — PAIN - FUNCTIONAL ASSESSMENT: PAIN_FUNCTIONAL_ASSESSMENT: 0-10

## 2019-02-25 ENCOUNTER — TELEPHONE (OUTPATIENT)
Dept: FAMILY MEDICINE CLINIC | Age: 69
End: 2019-02-25

## 2019-03-04 ENCOUNTER — OFFICE VISIT (OUTPATIENT)
Dept: FAMILY MEDICINE CLINIC | Age: 69
End: 2019-03-04
Payer: MEDICARE

## 2019-03-04 VITALS
WEIGHT: 249 LBS | OXYGEN SATURATION: 98 % | BODY MASS INDEX: 37.31 KG/M2 | SYSTOLIC BLOOD PRESSURE: 130 MMHG | HEART RATE: 66 BPM | DIASTOLIC BLOOD PRESSURE: 88 MMHG

## 2019-03-04 DIAGNOSIS — R49.0 HOARSENESS: ICD-10-CM

## 2019-03-04 DIAGNOSIS — H93.12 TINNITUS, LEFT: Primary | ICD-10-CM

## 2019-03-04 PROCEDURE — 1036F TOBACCO NON-USER: CPT | Performed by: FAMILY MEDICINE

## 2019-03-04 PROCEDURE — 1123F ACP DISCUSS/DSCN MKR DOCD: CPT | Performed by: FAMILY MEDICINE

## 2019-03-04 PROCEDURE — 3017F COLORECTAL CA SCREEN DOC REV: CPT | Performed by: FAMILY MEDICINE

## 2019-03-04 PROCEDURE — G8482 FLU IMMUNIZE ORDER/ADMIN: HCPCS | Performed by: FAMILY MEDICINE

## 2019-03-04 PROCEDURE — 4040F PNEUMOC VAC/ADMIN/RCVD: CPT | Performed by: FAMILY MEDICINE

## 2019-03-04 PROCEDURE — G8427 DOCREV CUR MEDS BY ELIG CLIN: HCPCS | Performed by: FAMILY MEDICINE

## 2019-03-04 PROCEDURE — G8417 CALC BMI ABV UP PARAM F/U: HCPCS | Performed by: FAMILY MEDICINE

## 2019-03-04 PROCEDURE — 1101F PT FALLS ASSESS-DOCD LE1/YR: CPT | Performed by: FAMILY MEDICINE

## 2019-03-04 PROCEDURE — 99213 OFFICE O/P EST LOW 20 MIN: CPT | Performed by: FAMILY MEDICINE

## 2019-03-04 ASSESSMENT — PATIENT HEALTH QUESTIONNAIRE - PHQ9
1. LITTLE INTEREST OR PLEASURE IN DOING THINGS: 0
SUM OF ALL RESPONSES TO PHQ QUESTIONS 1-9: 0
2. FEELING DOWN, DEPRESSED OR HOPELESS: 0
SUM OF ALL RESPONSES TO PHQ9 QUESTIONS 1 & 2: 0
SUM OF ALL RESPONSES TO PHQ QUESTIONS 1-9: 0

## 2019-05-16 ENCOUNTER — OFFICE VISIT (OUTPATIENT)
Dept: FAMILY MEDICINE CLINIC | Age: 69
End: 2019-05-16
Payer: MEDICARE

## 2019-05-16 VITALS
OXYGEN SATURATION: 95 % | SYSTOLIC BLOOD PRESSURE: 122 MMHG | BODY MASS INDEX: 36.71 KG/M2 | WEIGHT: 245 LBS | DIASTOLIC BLOOD PRESSURE: 82 MMHG | TEMPERATURE: 98.4 F | HEART RATE: 70 BPM

## 2019-05-16 DIAGNOSIS — J01.90 ACUTE NON-RECURRENT SINUSITIS, UNSPECIFIED LOCATION: Primary | ICD-10-CM

## 2019-05-16 PROCEDURE — 3017F COLORECTAL CA SCREEN DOC REV: CPT | Performed by: FAMILY MEDICINE

## 2019-05-16 PROCEDURE — G8427 DOCREV CUR MEDS BY ELIG CLIN: HCPCS | Performed by: FAMILY MEDICINE

## 2019-05-16 PROCEDURE — 1036F TOBACCO NON-USER: CPT | Performed by: FAMILY MEDICINE

## 2019-05-16 PROCEDURE — 1123F ACP DISCUSS/DSCN MKR DOCD: CPT | Performed by: FAMILY MEDICINE

## 2019-05-16 PROCEDURE — G8417 CALC BMI ABV UP PARAM F/U: HCPCS | Performed by: FAMILY MEDICINE

## 2019-05-16 PROCEDURE — 99213 OFFICE O/P EST LOW 20 MIN: CPT | Performed by: FAMILY MEDICINE

## 2019-05-16 PROCEDURE — 4040F PNEUMOC VAC/ADMIN/RCVD: CPT | Performed by: FAMILY MEDICINE

## 2019-05-16 RX ORDER — AZITHROMYCIN 250 MG/1
TABLET, FILM COATED ORAL
Qty: 1 PACKET | Refills: 0 | Status: SHIPPED | OUTPATIENT
Start: 2019-05-16 | End: 2019-06-10 | Stop reason: ALTCHOICE

## 2019-05-16 ASSESSMENT — ENCOUNTER SYMPTOMS: COUGH: 1

## 2019-06-10 ENCOUNTER — OFFICE VISIT (OUTPATIENT)
Dept: FAMILY MEDICINE CLINIC | Age: 69
End: 2019-06-10
Payer: MEDICARE

## 2019-06-10 VITALS
HEART RATE: 76 BPM | WEIGHT: 241.7 LBS | OXYGEN SATURATION: 97 % | HEIGHT: 67 IN | BODY MASS INDEX: 37.93 KG/M2 | TEMPERATURE: 98.6 F | DIASTOLIC BLOOD PRESSURE: 80 MMHG | SYSTOLIC BLOOD PRESSURE: 130 MMHG

## 2019-06-10 DIAGNOSIS — R97.20 ELEVATED PSA: ICD-10-CM

## 2019-06-10 DIAGNOSIS — Z00.00 ROUTINE GENERAL MEDICAL EXAMINATION AT A HEALTH CARE FACILITY: Primary | ICD-10-CM

## 2019-06-10 DIAGNOSIS — E78.5 HYPERLIPIDEMIA, UNSPECIFIED HYPERLIPIDEMIA TYPE: ICD-10-CM

## 2019-06-10 PROCEDURE — G8417 CALC BMI ABV UP PARAM F/U: HCPCS | Performed by: FAMILY MEDICINE

## 2019-06-10 PROCEDURE — 3017F COLORECTAL CA SCREEN DOC REV: CPT | Performed by: FAMILY MEDICINE

## 2019-06-10 PROCEDURE — 4040F PNEUMOC VAC/ADMIN/RCVD: CPT | Performed by: FAMILY MEDICINE

## 2019-06-10 PROCEDURE — G8427 DOCREV CUR MEDS BY ELIG CLIN: HCPCS | Performed by: FAMILY MEDICINE

## 2019-06-10 PROCEDURE — 1036F TOBACCO NON-USER: CPT | Performed by: FAMILY MEDICINE

## 2019-06-10 PROCEDURE — 1123F ACP DISCUSS/DSCN MKR DOCD: CPT | Performed by: FAMILY MEDICINE

## 2019-06-10 PROCEDURE — G0439 PPPS, SUBSEQ VISIT: HCPCS | Performed by: FAMILY MEDICINE

## 2019-06-10 PROCEDURE — 99213 OFFICE O/P EST LOW 20 MIN: CPT | Performed by: FAMILY MEDICINE

## 2019-06-10 RX ORDER — SIMVASTATIN 40 MG
TABLET ORAL
Qty: 90 TABLET | Refills: 1 | Status: SHIPPED | OUTPATIENT
Start: 2019-06-10 | End: 2019-12-09 | Stop reason: SDUPTHER

## 2019-06-10 ASSESSMENT — PATIENT HEALTH QUESTIONNAIRE - PHQ9
SUM OF ALL RESPONSES TO PHQ QUESTIONS 1-9: 0
SUM OF ALL RESPONSES TO PHQ QUESTIONS 1-9: 0

## 2019-06-10 ASSESSMENT — LIFESTYLE VARIABLES: HOW OFTEN DO YOU HAVE A DRINK CONTAINING ALCOHOL: 0

## 2019-06-10 ASSESSMENT — ANXIETY QUESTIONNAIRES: GAD7 TOTAL SCORE: 0

## 2019-06-10 NOTE — PATIENT INSTRUCTIONS
Personalized Preventive Plan for Eunice oBurne - 6/10/2019  Medicare offers a range of preventive health benefits. Some of the tests and screenings are paid in full while other may be subject to a deductible, co-insurance, and/or copay. Some of these benefits include a comprehensive review of your medical history including lifestyle, illnesses that may run in your family, and various assessments and screenings as appropriate. After reviewing your medical record and screening and assessments performed today your provider may have ordered immunizations, labs, imaging, and/or referrals for you. A list of these orders (if applicable) as well as your Preventive Care list are included within your After Visit Summary for your review. Other Preventive Recommendations:    · A preventive eye exam performed by an eye specialist is recommended every 1-2 years to screen for glaucoma; cataracts, macular degeneration, and other eye disorders. · A preventive dental visit is recommended every 6 months. · Try to get at least 150 minutes of exercise per week or 10,000 steps per day on a pedometer . · Order or download the FREE \"Exercise & Physical Activity: Your Everyday Guide\" from [employer]. Call 7-570.315.7642 or search [employer] online. · You need 6890-4170 mg of calcium and 6903-1250 IU of vitamin D per day. It is possible to meet your calcium requirement with diet alone, but a vitamin D supplement is usually necessary to meet this goal.  · When exposed to the sun, use a sunscreen that protects against both UVA and UVB radiation with an SPF of 30 or greater. Reapply every 2 to 3 hours or after sweating, drying off with a towel, or swimming. · Always wear a seat belt when traveling in a car. Always wear a helmet when riding a bicycle or motorcycle. Heart-Healthy Diet   Sodium, Fat, and Cholesterol Controlled Diet       What Is a Heart Healthy Diet?    A heart-healthy diet is one that limits sodium , certain types of fat , and cholesterol . This type of diet is recommended for:   People with any form of cardiovascular disease (eg, coronary heart disease , peripheral vascular disease , previous heart attack , previous stroke )   People with risk factors for cardiovascular disease, such as high blood pressure , high cholesterol , or diabetes   Anyone who wants to lower their risk of developing cardiovascular disease   Sodium    Sodium is a mineral found in many foods. In general, most people consume much more sodium than they need. Diets high in sodium can increase blood pressure and lead to edema (water retention). On a heart-healthy diet, you should consume no more than 2,300 mg (milligrams) of sodium per dayabout the amount in one teaspoon of table salt. The foods highest in sodium include table salt (about 50% sodium), processed foods, convenience foods, and preserved foods. Cholesterol    Cholesterol is a fat-like, waxy substance in your blood. Our bodies make some cholesterol. It is also found in animal products, with the highest amounts in fatty meat, egg yolks, whole milk, cheese, shellfish, and organ meats. On a heart-healthy diet, you should limit your cholesterol intake to less than 200 mg per day. It is normal and important to have some cholesterol in your bloodstream. But too much cholesterol can cause plaque to build up within your arteries, which can eventually lead to a heart attack or stroke. The two types of cholesterol that are most commonly referred to are:   Low-density lipoprotein (LDL) cholesterol  Also known as bad cholesterol, this is the cholesterol that tends to build up along your arteries. Bad cholesterol levels are increased by eating fats that are saturated or hydrogenated. Optimal level of this cholesterol is less than 100. Over 130 starts to get risky for heart disease.    High-density lipoprotein (HDL) cholesterol  Also known as good cholesterol, this type of cholesterol actually carries cholesterol away from your arteries and may, therefore, help lower your risk of having a heart attack. You want this level to be high (ideally greater than 60). It is a risk to have a level less than 40. You can raise this good cholesterol by eating olive oil, canola oil, avocados, or nuts. Exercise raises this level, too. Fat    Fat is calorie dense and packs a lot of calories into a small amount of food. Even though fats should be limited due to their high calorie content, not all fats are bad. In fact, some fats are quite healthful. Fat can be broken down into four main types. The good-for-you fats are:   Monounsaturated fat  found in oils such as olive and canola, avocados, and nuts and natural nut butters; can decrease cholesterol levels, while keeping levels of HDL cholesterol high   Polyunsaturated fat  found in oils such as safflower, sunflower, soybean, corn, and sesame; can decrease total cholesterol and LDL cholesterol   Omega-3 fatty acids  particularly those found in fatty fish (such as salmon, trout, tuna, mackerel, herring, and sardines); can decrease risk of arrhythmias, decrease triglyceride levels, and slightly lower blood pressure   The fats that you want to limit are:   Saturated fat  found in animal products, many fast foods, and a few vegetables; increases total blood cholesterol, including LDL levels   Animal fats that are saturated include: butter, lard, whole-milk dairy products, meat fat, and poultry skin   Vegetable fats that are saturated include: hydrogenated shortening, palm oil, coconut oil, cocoa butter   Hydrogenated or trans fat  found in margarine and vegetable shortening, most shelf stable snack foods, and fried foods; increases LDL and decreases HDL     It is generally recommended that you limit your total fat for the day to less than 30% of your total calories.  If you follow an 1800-calorie heart healthy diet, for example, this would mean 60 grams of fat or less per day. Saturated fat and trans fat in your diet raises your blood cholesterol the most, much more than dietary cholesterol does. For this reason, on a heart-healthy diet, less than 7% of your calories should come from saturated fat and ideally 0% from trans fat. On an 1800-calorie diet, this translates into less than 14 grams of saturated fat per day, leaving 46 grams of fat to come from mono- and polyunsaturated fats.    Food Choices on a Heart Healthy Diet   Food Category   Foods Recommended   Foods to Avoid   Grains   Breads and rolls without salted tops Most dry and cooked cereals Unsalted crackers and breadsticks Low-sodium or homemade breadcrumbs or stuffing All rice and pastas   Breads, rolls, and crackers with salted tops High-fat baked goods (eg, muffins, donuts, pastries) Quick breads, self-rising flour, and biscuit mixes Regular bread crumbs Instant hot cereals Commercially prepared rice, pasta, or stuffing mixes   Vegetables   Most fresh, frozen, and low-sodium canned vegetables Low-sodium and salt-free vegetable juices Canned vegetables if unsalted or rinsed   Regular canned vegetables and juices, including sauerkraut and pickled vegetables Frozen vegetables with sauces Commercially prepared potato and vegetable mixes   Fruits   Most fresh, frozen, and canned fruits All fruit juices   Fruits processed with salt or sodium   Milk   Nonfat or low-fat (1%) milk Nonfat or low-fat yogurt Cottage cheese, low-fat ricotta, cheeses labeled as low-fat and low-sodium   Whole milk Reduced-fat (2%) milk Malted and chocolate milk Full fat yogurt Most cheeses (unless low-fat and low salt) Buttermilk (no more than 1 cup per week)   Meats and Beans   Lean cuts of fresh or frozen beef, veal, lamb, or pork (look for the word loin) Fresh or frozen poultry without the skin Fresh or frozen fish and some shellfish Egg whites and egg substitutes (Limit whole eggs to three per week) Tofu Nuts or seeds (unsalted, dry-roasted), low-sodium peanut butter Dried peas, beans, and lentils   Any smoked, cured, salted, or canned meat, fish, or poultry (including nova, chipped beef, cold cuts, hot dogs, sausages, sardines, and anchovies) Poultry skins Breaded and/or fried fish or meats Canned peas, beans, and lentils Salted nuts   Fats and Oils   Olive oil and canola oil Low-sodium, low-fat salad dressings and mayonnaise   Butter, margarine, coconut and palm oils, nova fat   Snacks, Sweets, and Condiments   Low-sodium or unsalted versions of broths, soups, soy sauce, and condiments Pepper, herbs, and spices; vinegar, lemon, or lime juice Low-fat frozen desserts (yogurt, sherbet, fruit bars) Sugar, cocoa powder, honey, syrup, jam, and preserves Low-fat, trans-fat free cookies, cakes, and pies Edmond and animal crackers, fig bars, xena snaps   High-fat desserts Broth, soups, gravies, and sauces, made from instant mixes or other high-sodium ingredients Salted snack foods Canned olives Meat tenderizers, seasoning salt, and most flavored vinegars   Beverages   Low-sodium carbonated beverages Tea and coffee in moderation Soy milk   Commercially softened water   Suggestions   Make whole grains, fruits, and vegetables the base of your diet. Choose heart-healthy fats such as canola, olive, and flaxseed oil, and foods high in heart-healthy fats, such as nuts, seeds, soybeans, tofu, and fish. Eat fish at least twice per week; the fish highest in omega-3 fatty acids and lowest in mercury include salmon, herring, mackerel, sardines, and canned chunk light tuna. If you eat fish less than twice per week or have high triglycerides, talk to your doctor about taking fish oil supplements. Read food labels.    For products low in fat and cholesterol, look for fat free, low-fat, cholesterol free, saturated fat free, and trans fat freeAlso scan the Nutrition Facts Label, which lists saturated fat, trans fat, care is a key part of your treatment and safety. Be sure to make and go to all appointments, and call your doctor if you are having problems. It's also a good idea to know your test results and keep a list of the medicines you take. How can you care for yourself at home? Get enough calcium and vitamin D. The [employer] recommends adults younger than age 46 need 1,000 mg of calcium and 600 IU of vitamin D each day. Women ages 46 to 79 need 1,200 mg of calcium and 600 IU of vitamin D each day. Men ages 46 to 79 need 1,000 mg of calcium and 600 IU of vitamin D each day. Adults 71 and older need 1,200 mg of calcium and 800 IU of vitamin D each day. Eat foods rich in calcium, like yogurt, cheese, milk, and dark green vegetables. Eat foods rich in vitamin D, like eggs, fatty fish, cereal, and fortified milk. Get some sunshine. Your body uses sunshine to make its own vitamin D. The safest time to be out in the sun is before 10 a.m. or after 3 p.m. Avoid getting sunburned. Sunburn can increase your risk of skin cancer. Talk to your doctor about taking a calcium plus vitamin D supplement. Ask about what type of calcium is right for you, and how much to take at a time. Adults ages 23 to 48 should not get more than 2,500 mg of calcium and 4,000 IU of vitamin D each day, whether it is from supplements and/or food. Adults ages 46 and older should not get more than 2,000 mg of calcium and 4,000 IU of vitamin D each day from supplements and/or food. Get regular bone-building exercise. Weight-bearing and resistance exercises keep bones healthy by working the muscles and bones against gravity. Start out at an exercise level that feels right for you. Add a little at a time until you can do the following:  Do 30 minutes of weight-bearing exercise on most days of the week. Walking, jogging, stair climbing, and dancing are good choices. Do resistance exercises with weights or elastic bands 2 to 3 days a week.   Limit grocery store or brushing your teeth using your nondominant hand   Use Memory Aids   There is no need to remember every detail on your own. These memory aids can help:   Calendars and day planners   Electronic organizers to store all sorts of helpful informationThese devices can \"beep\" to remind you of appointments. A book of days to record birthdays, anniversaries, and other occasions that occur on the same date every year   Detailed \"to-do\" lists and strategically placed sticky notes   Quick \"study\" sessionsBefore a gathering, review who will be there so their names will be fresh in your mind. Establish routinesFor example, keep your keys, wallet, and umbrella in the same place all the time or take medicine with your 8:00 AM glass of juice   Live a Healthy Life   Many actions that will keep your body strong will do the same for your mind. For example:   Talk to Your Doctor About Herbs and Supplements    Malnutrition and vitamin deficiencies can impair your mental function. For example, vitamin B12 deficiency can cause a range of symptoms, including confusion. But, what if your nutritional needs are being met? Can herbs and supplements still offer a benefit? Researchers have investigated a range of natural remedies, such as ginkgo , ginseng , and the supplement phosphatidylserine (PS). So far, though, the evidence is inconsistent as to whether these products can improve memory or thinking. If you are interested in taking herbs and supplements, talk to your doctor first because they may interact with other medicines that you are taking. Exercise Regularly    Among the many benefits of regular exercise are increased blood flow to the brain and decreased risk of certain diseases that can interfere with memory function. One study found that even moderate exercise has a beneficial effect.  Examples of \"moderate\" exercise include:   Playing 18 holes of golf once a week, without a cart   Playing tennis twice a week Walking one mile per day   Manage Stress    It can be tough to remember what is important when your mind is cluttered. Make time for relaxation. Choose activities that calm you down, and make it routine. Manage Chronic Conditions    Side effects of high blood pressure , diabetes, and heart disease can interfere with mental function. Many of the lifestyle steps discussed here can help manage these conditions. Strive to eat a healthy diet, exercise regularly, get stress under control, and follow your doctor's advice for your condition. Minimize Medications    Talk to your doctor about the medicines that you take. Some may be unnecessary. Also, healthy lifestyle habits may lower the need for certain drugs. Last Reviewed: April 2010 Sami Brady MD   Updated: 4/13/2010   ·     [address] [place] [address]       As we get older, changes in balance, gait, strength, vision, hearing, and cognition make even the most youthful senior more prone to accidents. Falls are one of the leading health risks for older people. This increased risk of falling is related to:   Aging process (eg, decreased muscle strength, slowed reflexes)   Higher incidence of chronic health problems (eg, arthritis, diabetes) that may limit mobility, agility or sensory awareness   Side effects of medicine (eg, dizziness, blurred vision)especially medicines like prescription pain medicines and drugs used to treat mental health conditions   Depending on the brittleness of your bones, the consequences of a fall can be serious and long lasting. Home Life   Research by the Association of Aging [facility]) shows that some home accidents among older adults can be prevented by making simple lifestyle changes and basic modifications and repairs to the home environment. Here are some lifestyle changes that experts recommend:   Have your hearing and vision checked regularly. Be sure to wear prescription glasses that are right for you.    Speak to your doctor or pharmacist about the possible side effects of your medicines. A number of medicines can cause dizziness. If you have problems with sleep, talk to your doctor. Limit your intake of alcohol. If necessary, use a cane or walker to help maintain your balance. Wear supportive, rubber-soled shoes, even at home. If you live in a region that gets wintry weather, you may want to put special cleats on your shoes to prevent you from slipping on the snow and ice. Exercise regularly to help maintain muscle tone, agility, and balance. Always hold the banister when going up or down stairs. Also, use  bars when getting in or out of the bath or shower, or using the toilet. To avoid dizziness, get up slowly from a lying down position. Sit up first, dangling your legs for a minute or two before rising to a standing position. Overall Home Safety Check   According to the Consumer [employer] Safety Commision's \"Older Consumer Home Safety Checklist,\" it is important to check for potential hazards in each room. And remember, proper lighting is an essential factor in home safety. If you cannot see clearly, you are more likely to fall. Important questions to ask yourself include:   Are lamp, electric, extension, and telephone cords placed out of the flow of traffic and maintained in good condition? Have frayed cords been replaced? Are all small rugs and runners slip resistant? If not, you can secure them to the floor with a special double-sided carpet tape. Are smoke detectors properly locatedone on every floor of your home and one outside of every sleeping area? Are they in good working order? Are batteries replaced at least once a year? Do you have a well-maintained carbon monoxide detector outside every sleeping are in your home? Does your furniture layout leave plenty of space to maneuver between and around chairs, tables, beds, and sofas? Are hallways, stairs and passages between rooms well lit?  Can you reach a lamp without getting out of bed? Are floor surfaces well maintained? Shag rugs, high-pile carpeting, tile floors, and polished wood floors can be particularly slippery. Stairs should always have handrails and be carpeted or fitted with a non-skid tread. Is your telephone easily reachable. Is the cord safely tucked away? Room by Room   According to the Association of Aging, bathrooms and cecelia are the two most potentially hazardous rooms in your home. In the Kitchen    Be sure your stove is in proper working order and always make sure burners and the oven are off before you go out or go to sleep. Keep pots on the back burners, turn handles away from the front of the stove, and keep stove clean and free of grease build-up. Kitchen ventilation systems and range exhausts should be working properly. Keep flammable objects such as towels and pot holders away from the cooking area except when in use. Make sure kitchen curtains are tied back. Move cords and appliances away from the sink and hot surfaces. If extension cords are needed, install wiring guides so they do not hang over the sink, range, or working areas. Look for coffee pots, kettles and toaster ovens with automatic shut-offs. Keep a mop handy in the kitchen so you can wipe up spills instantly. You should also have a small fire extinguisher. Arrange your kitchen with frequently used items on lower shelves to avoid the need to stand on a stepstool to reach them. Make sure countertops are well-lit to avoid injuries while cutting and preparing food. In the Bathroom    Use a non-slip mat or decals in the tub and shower, since wet, soapy tile or porcelain surfaces are extremely slippery. Make sure bathroom rugs are non-skid or tape them firmly to the floor. Bathtubs should have at least one, preferably two, grab bars, firmly attached to structural supports in the wall.  (Do not use built-in soap holders or glass shower doors as grab bars.)    Tub seats fitted with non-slip material on the legs allow you to wash sitting down. For people with limited mobility, bathtub transfer benches allow you to slide safely into the tub. Raised toilet seats and toilet safety rails are helpful for those with knee or hip problems. In the [facility]    Make sure you use a nightlight and that the area around your bed is clear of potential obstacles. Be careful with electric blankets and never go to sleep with a heating pad, which can cause serious burns even if on a low setting. Use fire-resistant mattress covers and pillows, and NEVER smoke in bed. Keep a phone next to the bed that is programmed to dial 911 at the push of a button. If you have a chronic condition, you may want to sign on with an automatic call-in service. Typically the system includes a small pendant that connects directly to an emergency medical voice-response system. You should also make arrangements to stay in contact with someonefriend, neighbor, family memberon a regular schedule. Fire Prevention   According to the [employer]. (Smoke Alarms for Every) [address] [place] [address], senior citizens are one of the two highest risk groups for death and serious injuries due to residential fires. When cooking, wear short-sleeved items, never a bulky long-sleeved robe. The [facility]'s Safety Checklist for Older Consumers emphasizes the importance of checking basements, garages, workshops and storage areas for fire hazards, such as volatile liquids, piles of old rags or clothing and overloaded circuits. Never smoke in bed or when lying down on a couch or recliner chair. Small portable electric or kerosene heaters are responsible for many home fires and should be used cautiously if at all. If you do use one, be sure to keep them away from flammable materials. In case of fire, make sure you have a pre-established emergency exit plan.     Have a professional check your fireplace and other fuel-burning appliances yearly. [employer]   Baby boomers entering the medel years will continue to see the development of new products to help older adults live safely and independently in spite of age-related changes. Making Life More Livable  , by Renu Paul, lists over 1,000 products for \"living well in the mature years,\" such as bathing and mobility aids, household security devices, ergonomically designed knives and peelers, and faucet valves and knobs for temperature control. Medical supply stores and organizations are good sources of information about products that improve your quality of life and insure your safety. Last Reviewed: November 2009 Jamia Gallego MD   Updated: 3/7/2011     ·        Advance Care Planning: Care Instructions  Your Care Instructions    It can be hard to live with an illness that cannot be cured. But if your health is getting worse, you may want to make decisions about end-of-life care. Planning for the end of your life does not mean that you are giving up. It is a way to make sure that your wishes are met. Clearly stating your wishes can make it easier for your loved ones. Making plans while you are still able may also ease your mind and make your final days less stressful and more meaningful. Follow-up care is a key part of your treatment and safety. Be sure to make and go to all appointments, and call your doctor if you are having problems. It's also a good idea to know your test results and keep a list of the medicines you take. What can you do to plan for the end of life? You can bring these issues up with your doctor. You do not need to wait until your doctor starts the conversation. You might start with \"I would not be willing to live with . Radha Garcia \" When you complete this sentence it helps your doctor understand your wishes. Talk openly and honestly with your doctor.  This is the best way to understand the decisions you will need to make as your health changes. Know that you can always change your mind. Ask your doctor about commonly used life-support measures. These include tube feedings, breathing machines, and fluids given through a vein (IV). Understanding these treatments will help you decide whether you want them. You may choose to have these life-supporting treatments for a limited time. This allows a trial period to see whether they will help you. You may also decide that you want your doctor to take only certain measures to keep you alive. It is important to spell out these conditions so that your doctor and family understand them. Talk to your doctor about how long you are likely to live. He or she may be able to give you an idea of what usually happens with your specific illness. Think about preparing papers that state your wishes. This way there will not be any confusion about what you want. You can change your instructions at any time. Which papers should you prepare? Advance directives are legal papers that tell doctors how you want to be cared for at the end of your life. You do not need a  to write these papers. Ask your doctor or your state [facility] department for information on how to write your advance directives. They may have the forms for each of these types of papers. Make sure your doctor has a copy of these on file, and give a copy to a family member or close friend. Consider a do-not-resuscitate order (DNR). This order asks that no extra treatments be done if your heart stops or you stop breathing. Extra treatments may include cardiopulmonary resuscitation (CPR), electrical shock to restart your heart, or a machine to breathe for you. If you decide to have a DNR order, ask your doctor to explain and write it. Place the order in your home where everyone can easily see it. Consider a living will.  A living will explains your wishes about life support and other treatments at the end of your life if you become unable to speak for yourself. Living gallagher tell doctors to use or not use treatments that would keep you alive. You must have one or two witnesses or a notary present when you sign this form. Consider a durable power of  for health care. This allows you to name a person to make decisions about your care if you are not able to. Most people ask a close friend or family member. Talk to this person about the kinds of treatments you want and those that you do not want. Make sure this person understands your wishes. These legal papers are simple to change. Tell your doctor what you want to change, and ask him or her to make a note in your medical file. Give your family updated copies of the papers. Where can you learn more? Go to [email or web address] org and sign in to your NextCloud account. Enter P184 in the InView Technology box to learn more about \"Advance Care Planning: Care Instructions. \"     If you do not have an account, please click on the \"Sign Up Now\" link. Current as of: April 18, 2018  Content Version: 12.0  © 9743-8161 Healthwise, [employer]. Care instructions adapted under license by [facility] ([facility]). If you have questions about a medical condition or this instruction, always ask your healthcare professional. [address] any warranty or liability for your use of this information. ·        Learning About Living Evans Power  What is a living will? A living will is a legal form you use to write down the kind of care you want at the end of your life. It is used by the health professionals who will treat you if you aren't able to decide for yourself. If you put your wishes in writing, your loved ones and others will know what kind of care you want. They won't need to guess. This can ease your mind and be helpful to others. A living will is not the same as an estate or property will.  An estate will explains what you want to happen with your money and property after you die. Is a living will a legal document? A living will is a legal document. Each state has its own laws about living gallagher. If you move to another state, make sure that your living will is legal in the state where you now live. Or you might use a universal form that has been approved by many states. This kind of form can sometimes be completed and stored online. Your electronic copy will then be available wherever you have a connection to the Internet. In most cases, doctors will respect your wishes even if you have a form from a different state. You don't need an  to complete a living will. But legal advice can be helpful if your state's laws are unclear, your health history is complicated, or your family can't agree on what should be in your living will. You can change your living will at any time. Some people find that their wishes about end-of-life care change as their health changes. In addition to making a living will, think about completing a medical power of  form. This form lets you name the person you want to make end-of-life treatment decisions for you (your \"health care agent\") if you're not able to. Many hospitals and nursing homes will give you the forms you need to complete a living will and a medical power of . Your living will is used only if you can't make or communicate decisions for yourself anymore. If you become able to make decisions again, you can accept or refuse any treatment, no matter what you wrote in your living will. Your state may offer an online registry. This is a place where you can store your living will online so the doctors and nurses who need to treat you can find it right away. What should you think about when creating a living will? Talk about your end-of-life wishes with your family members and your doctor. Let them know what you want. That way the people making decisions for you won't be surprised by your choices.   Think about these questions as you make your living will:  Do you know enough about life support methods that might be used? If not, talk to your doctor so you know what might be done if you can't breathe on your own, your heart stops, or you're unable to swallow. What things would you still want to be able to do after you receive life-support methods? Would you want to be able to walk? To speak? To eat on your own? To live without the help of machines? If you have a choice, where do you want to be cared for? In your home? At a hospital or nursing home? Do you want certain Jew practices performed if you become very ill? If you have a choice at the end of your life, where would you prefer to die? At home? In a hospital or nursing home? Somewhere else? Would you prefer to be buried or cremated? Do you want your organs to be donated after you die? What should you do with your living will? Make sure that your family members and your health care agent have copies of your living will. Give your doctor a copy of your living will to keep in your medical record. If you have more than one doctor, make sure that each one has a copy. You may want to put a copy of your living will where it can be easily found. Where can you learn more? Go to https://CorvisaCloud.[email or web address] org and sign in to your Grid2Home account. Enter P985 in the [facility] box to learn more about \"Learning About Living Caesar Freeze. \"     If you do not have an account, please click on the \"Sign Up Now\" link. Current as of: April 18, 2018  Content Version: 12.0  © 3591-2936 Healthwise, Incorporated. Care instructions adapted under license by [facility] ([facility]). If you have questions about a medical condition or this instruction, always ask your healthcare professional. [address] any warranty or liability for your use of this information.     ·        Learning About Durable Power of  for Health Care  What is a durable power decisions. When you name a health care agent, it is very clear who has the power to make health decisions for you. How do you name a health care agent? You name your health care agent on a legal form. It is usually called a durable power of  for health care. Ask your hospital, state bar association, or office on aging where to find these forms. You must sign the form to make it legal. Some states require you to get the form notarized. This means that a person called a  watches you sign the form and then he or she signs the form. Some states also require that two or more witnesses sign the form. Be sure to tell your family members and doctors who your health care agent is. Keep your forms in a safe place. But make sure that your loved ones know where the forms are. This could be in your desk where you keep other important papers. Make sure your doctor has a copy of your forms. Where can you learn more? Go to https://[email or web address].[email or web address] org and sign in to your Evcarco account. Enter [id number] in the SheZoom box to learn more about \"Learning About Durable Power of  for Health Care. \"     If you do not have an account, please click on the \"Sign Up Now\" link. Current as of: April 18, 2018  Content Version: 12.0  © 6643-7828 Healthwise, Incorporated. Care instructions adapted under license by [facility] ([facility]). If you have questions about a medical condition or this instruction, always ask your healthcare professional. [place] [address] any warranty or liability for your use of this information.     ·

## 2019-06-10 NOTE — PROGRESS NOTES
Melonie Mejia is a 76 y.o. male    Chief Complaint   Patient presents with    Medicare AWV    Hyperlipidemia    Other     elevated psa       HPI:    Hyperlipidemia   This is a chronic problem. The current episode started more than 1 year ago. The problem is controlled. Recent lipid tests were reviewed and are normal. He has no history of diabetes. Pertinent negatives include no myalgias. Current antihyperlipidemic treatment includes statins. The current treatment provides significant improvement of lipids. There are no compliance problems. Risk factors for coronary artery disease include male sex. Elevated PSA. Following with Urology. ROS:    Review of Systems   Musculoskeletal: Negative for myalgias. /80 (Site: Right Upper Arm, Position: Sitting, Cuff Size: Large Adult)   Pulse 76   Temp 98.6 °F (37 °C) (Oral)   Ht 5' 7\" (1.702 m)   Wt 241 lb 11.2 oz (109.6 kg)   SpO2 97%   BMI 37.86 kg/m²     Physical Exam:    Physical Exam   Constitutional: He appears well-developed. No distress. Skin: He is not diaphoretic. Psychiatric: He has a normal mood and affect. His behavior is normal.       Current Outpatient Medications   Medication Sig Dispense Refill    simvastatin (ZOCOR) 40 MG tablet TAKE 1 TABLET BY MOUTH EVERY EVENING 90 tablet 1     No current facility-administered medications for this visit. Assessment:    1. Routine general medical examination at a health care facility    2. Hyperlipidemia, unspecified hyperlipidemia type    3. Elevated PSA        Plan:    1. Routine general medical examination at a health care facility    2. Hyperlipidemia, unspecified hyperlipidemia type  Stable. Continue current medications. - simvastatin (ZOCOR) 40 MG tablet; TAKE 1 TABLET BY MOUTH EVERY EVENING  Dispense: 90 tablet; Refill: 1    3. Elevated PSA  Stable. Following with Urology. Return in about 6 months (around 12/10/2019) for Hyperlipidemia, elevated PSA.       Medicare Annual Wellness Visit  Name: Garima Horan Date: 6/10/2019   MRN: [id number] Sex: Male   Age: 76 y.o. Ethnicity: Non-/Non    : 1950 Race: Darlen Gilford is here for Medicare AWV; Hyperlipidemia; and Other (elevated psa)    Screenings for behavioral, psychosocial and functional/safety risks, and cognitive dysfunction are all negative except as indicated below. These results, as well as other patient data from the [address] [facility], [place], operated by [facility] form, are documented in Flowsheets linked to this Encounter. No Known Allergies    Prior to Visit Medications    Medication Sig Taking? Authorizing Provider   simvastatin (ZOCOR) 40 MG tablet TAKE 1 TABLET BY MOUTH EVERY EVENING Yes Gordo Jimenez DO       Past Medical History:   Diagnosis Date    Amblyopia     OS    Cataract     Hyperlipidemia      Past Surgical History:   Procedure Laterality Date    COLONOSCOPY N/A 2019    COLONOSCOPY performed by Kady Silverman MD at [address]       Family History   Problem Relation Age of Onset    Cancer Father        CareTeam (Including outside providers/suppliers regularly involved in providing care):   Patient Care Team:  Gordo Jimenez DO as PCP - General (Family Medicine)  Gordo Jimenez DO as PCP - [facility] Empaneled Provider  Justice Lundberg as Consulting Physician (Ophthalmology)    Wt Readings from Last 3 Encounters:   06/10/19 241 lb 11.2 oz (109.6 kg)   19 245 lb (111.1 kg)   19 249 lb (112.9 kg)     Vitals:    06/10/19 0830   BP: 130/80   Site: Right Upper Arm   Position: Sitting   Cuff Size: Large Adult   Pulse: 76   Temp: 98.6 °F (37 °C)   TempSrc: Oral   SpO2: 97%   Weight: 241 lb 11.2 oz (109.6 kg)   Height: 5' 7\" (1.702 m)     Body mass index is 37.86 kg/m². Based upon direct observation of the patient, evaluation of cognition reveals recent and remote memory intact.     Patient's complete Health Risk Assessment and screening values have been reviewed and years is provided to the patient in written form: see Patient Instructions/AVS.    Max SOLIS LPN, 8/08/0643, performed the documented evaluation under the direct supervision of the attending physician.

## 2019-11-18 ENCOUNTER — HOSPITAL ENCOUNTER (OUTPATIENT)
Dept: GENERAL RADIOLOGY | Age: 69
Discharge: HOME | End: 2019-11-18
Payer: MEDICARE

## 2019-11-18 ENCOUNTER — HOSPITAL ENCOUNTER (OUTPATIENT)
Age: 69
Discharge: HOME | End: 2019-11-18
Payer: MEDICARE

## 2019-11-18 ENCOUNTER — OFFICE VISIT (OUTPATIENT)
Dept: FAMILY MEDICINE CLINIC | Age: 69
End: 2019-11-18
Payer: MEDICARE

## 2019-11-18 VITALS — DIASTOLIC BLOOD PRESSURE: 83 MMHG | OXYGEN SATURATION: 98 % | SYSTOLIC BLOOD PRESSURE: 130 MMHG | HEART RATE: 86 BPM

## 2019-11-18 DIAGNOSIS — M25.561 RIGHT KNEE PAIN, UNSPECIFIED CHRONICITY: ICD-10-CM

## 2019-11-18 DIAGNOSIS — M25.561 ACUTE PAIN OF RIGHT KNEE: ICD-10-CM

## 2019-11-18 DIAGNOSIS — M25.561 ACUTE PAIN OF RIGHT KNEE: Primary | ICD-10-CM

## 2019-11-18 PROCEDURE — G8482 FLU IMMUNIZE ORDER/ADMIN: HCPCS | Performed by: FAMILY MEDICINE

## 2019-11-18 PROCEDURE — 99213 OFFICE O/P EST LOW 20 MIN: CPT | Performed by: FAMILY MEDICINE

## 2019-11-18 PROCEDURE — 4040F PNEUMOC VAC/ADMIN/RCVD: CPT | Performed by: FAMILY MEDICINE

## 2019-11-18 PROCEDURE — 1123F ACP DISCUSS/DSCN MKR DOCD: CPT | Performed by: FAMILY MEDICINE

## 2019-11-18 PROCEDURE — 73560 X-RAY EXAM OF KNEE 1 OR 2: CPT

## 2019-11-18 PROCEDURE — 3017F COLORECTAL CA SCREEN DOC REV: CPT | Performed by: FAMILY MEDICINE

## 2019-11-18 PROCEDURE — G8427 DOCREV CUR MEDS BY ELIG CLIN: HCPCS | Performed by: FAMILY MEDICINE

## 2019-11-18 PROCEDURE — G8417 CALC BMI ABV UP PARAM F/U: HCPCS | Performed by: FAMILY MEDICINE

## 2019-11-18 PROCEDURE — 1036F TOBACCO NON-USER: CPT | Performed by: FAMILY MEDICINE

## 2019-12-09 ENCOUNTER — OFFICE VISIT (OUTPATIENT)
Dept: FAMILY MEDICINE CLINIC | Age: 69
End: 2019-12-09
Payer: MEDICARE

## 2019-12-09 VITALS
OXYGEN SATURATION: 97 % | HEART RATE: 74 BPM | DIASTOLIC BLOOD PRESSURE: 86 MMHG | BODY MASS INDEX: 37.59 KG/M2 | SYSTOLIC BLOOD PRESSURE: 132 MMHG | WEIGHT: 240 LBS

## 2019-12-09 DIAGNOSIS — E78.5 HYPERLIPIDEMIA, UNSPECIFIED HYPERLIPIDEMIA TYPE: Primary | ICD-10-CM

## 2019-12-09 DIAGNOSIS — M25.561 ACUTE PAIN OF RIGHT KNEE: ICD-10-CM

## 2019-12-09 DIAGNOSIS — R09.89 CHRONIC SINUS COMPLAINTS: ICD-10-CM

## 2019-12-09 DIAGNOSIS — Z13.1 SCREENING FOR DIABETES MELLITUS: ICD-10-CM

## 2019-12-09 DIAGNOSIS — R97.20 ELEVATED PSA: ICD-10-CM

## 2019-12-09 LAB
A/G RATIO: 1.5 (ref 1.1–2.2)
ALBUMIN SERPL-MCNC: 4.4 G/DL (ref 3.4–5)
ALP BLD-CCNC: 66 U/L (ref 40–129)
ALT SERPL-CCNC: 28 U/L (ref 10–40)
ANION GAP SERPL CALCULATED.3IONS-SCNC: 13 MMOL/L (ref 3–16)
AST SERPL-CCNC: 26 U/L (ref 15–37)
BASOPHILS ABSOLUTE: 0.1 K/UL (ref 0–0.2)
BASOPHILS RELATIVE PERCENT: 0.8 %
BILIRUB SERPL-MCNC: 0.4 MG/DL (ref 0–1)
BUN BLDV-MCNC: 17 MG/DL (ref 7–20)
CALCIUM SERPL-MCNC: 9.5 MG/DL (ref 8.3–10.6)
CHLORIDE BLD-SCNC: 101 MMOL/L (ref 99–110)
CHOLESTEROL, TOTAL: 178 MG/DL (ref 0–199)
CO2: 25 MMOL/L (ref 21–32)
CREAT SERPL-MCNC: 0.9 MG/DL (ref 0.8–1.3)
EOSINOPHILS ABSOLUTE: 0.7 K/UL (ref 0–0.6)
EOSINOPHILS RELATIVE PERCENT: 11.3 %
GFR AFRICAN AMERICAN: >60
GFR NON-AFRICAN AMERICAN: >60
GLOBULIN: 3 G/DL
GLUCOSE BLD-MCNC: 106 MG/DL (ref 70–99)
HBA1C MFR BLD: 5.7 %
HCT VFR BLD CALC: 42.6 % (ref 40.5–52.5)
HDLC SERPL-MCNC: 58 MG/DL (ref 40–60)
HEMOGLOBIN: 14.2 G/DL (ref 13.5–17.5)
LDL CHOLESTEROL CALCULATED: 101 MG/DL
LYMPHOCYTES ABSOLUTE: 1.3 K/UL (ref 1–5.1)
LYMPHOCYTES RELATIVE PERCENT: 20.8 %
MCH RBC QN AUTO: 29.7 PG (ref 26–34)
MCHC RBC AUTO-ENTMCNC: 33.3 G/DL (ref 31–36)
MCV RBC AUTO: 89.2 FL (ref 80–100)
MONOCYTES ABSOLUTE: 0.4 K/UL (ref 0–1.3)
MONOCYTES RELATIVE PERCENT: 5.7 %
NEUTROPHILS ABSOLUTE: 3.8 K/UL (ref 1.7–7.7)
NEUTROPHILS RELATIVE PERCENT: 61.4 %
PDW BLD-RTO: 13.9 % (ref 12.4–15.4)
PLATELET # BLD: 206 K/UL (ref 135–450)
PMV BLD AUTO: 9.1 FL (ref 5–10.5)
POTASSIUM SERPL-SCNC: 4.7 MMOL/L (ref 3.5–5.1)
RBC # BLD: 4.77 M/UL (ref 4.2–5.9)
SODIUM BLD-SCNC: 139 MMOL/L (ref 136–145)
TOTAL PROTEIN: 7.4 G/DL (ref 6.4–8.2)
TRIGL SERPL-MCNC: 95 MG/DL (ref 0–150)
VLDLC SERPL CALC-MCNC: 19 MG/DL
WBC # BLD: 6.3 K/UL (ref 4–11)

## 2019-12-09 PROCEDURE — 3017F COLORECTAL CA SCREEN DOC REV: CPT | Performed by: FAMILY MEDICINE

## 2019-12-09 PROCEDURE — 99214 OFFICE O/P EST MOD 30 MIN: CPT | Performed by: FAMILY MEDICINE

## 2019-12-09 PROCEDURE — G8482 FLU IMMUNIZE ORDER/ADMIN: HCPCS | Performed by: FAMILY MEDICINE

## 2019-12-09 PROCEDURE — 1036F TOBACCO NON-USER: CPT | Performed by: FAMILY MEDICINE

## 2019-12-09 PROCEDURE — G8427 DOCREV CUR MEDS BY ELIG CLIN: HCPCS | Performed by: FAMILY MEDICINE

## 2019-12-09 PROCEDURE — 1123F ACP DISCUSS/DSCN MKR DOCD: CPT | Performed by: FAMILY MEDICINE

## 2019-12-09 PROCEDURE — 4040F PNEUMOC VAC/ADMIN/RCVD: CPT | Performed by: FAMILY MEDICINE

## 2019-12-09 PROCEDURE — G8417 CALC BMI ABV UP PARAM F/U: HCPCS | Performed by: FAMILY MEDICINE

## 2019-12-09 PROCEDURE — 83036 HEMOGLOBIN GLYCOSYLATED A1C: CPT | Performed by: FAMILY MEDICINE

## 2019-12-09 RX ORDER — SIMVASTATIN 40 MG
TABLET ORAL
Qty: 90 TABLET | Refills: 1 | Status: SHIPPED | OUTPATIENT
Start: 2019-12-09 | End: 2020-06-11

## 2019-12-09 ASSESSMENT — ENCOUNTER SYMPTOMS
SINUS COMPLAINT: 1
SINUS PRESSURE: 1

## 2019-12-12 ENCOUNTER — OFFICE VISIT (OUTPATIENT)
Dept: ENT CLINIC | Age: 69
End: 2019-12-12
Payer: MEDICARE

## 2019-12-12 VITALS
HEIGHT: 68 IN | HEART RATE: 67 BPM | DIASTOLIC BLOOD PRESSURE: 77 MMHG | BODY MASS INDEX: 36.68 KG/M2 | SYSTOLIC BLOOD PRESSURE: 139 MMHG | WEIGHT: 242 LBS | TEMPERATURE: 97.1 F

## 2019-12-12 DIAGNOSIS — H91.90 HEARING LOSS, UNSPECIFIED HEARING LOSS TYPE, UNSPECIFIED LATERALITY: ICD-10-CM

## 2019-12-12 DIAGNOSIS — H93.12 TINNITUS OF LEFT EAR: Primary | ICD-10-CM

## 2019-12-12 DIAGNOSIS — R09.89 GLOBUS SENSATION: ICD-10-CM

## 2019-12-12 DIAGNOSIS — R06.83 SNORING: ICD-10-CM

## 2019-12-12 PROCEDURE — G8417 CALC BMI ABV UP PARAM F/U: HCPCS | Performed by: OTOLARYNGOLOGY

## 2019-12-12 PROCEDURE — 3017F COLORECTAL CA SCREEN DOC REV: CPT | Performed by: OTOLARYNGOLOGY

## 2019-12-12 PROCEDURE — 99203 OFFICE O/P NEW LOW 30 MIN: CPT | Performed by: OTOLARYNGOLOGY

## 2019-12-12 PROCEDURE — 1123F ACP DISCUSS/DSCN MKR DOCD: CPT | Performed by: OTOLARYNGOLOGY

## 2019-12-12 PROCEDURE — 1036F TOBACCO NON-USER: CPT | Performed by: OTOLARYNGOLOGY

## 2019-12-12 PROCEDURE — G8427 DOCREV CUR MEDS BY ELIG CLIN: HCPCS | Performed by: OTOLARYNGOLOGY

## 2019-12-12 PROCEDURE — 4040F PNEUMOC VAC/ADMIN/RCVD: CPT | Performed by: OTOLARYNGOLOGY

## 2019-12-12 PROCEDURE — 31575 DIAGNOSTIC LARYNGOSCOPY: CPT | Performed by: OTOLARYNGOLOGY

## 2019-12-12 PROCEDURE — G8482 FLU IMMUNIZE ORDER/ADMIN: HCPCS | Performed by: OTOLARYNGOLOGY

## 2019-12-12 ASSESSMENT — ENCOUNTER SYMPTOMS
NAUSEA: 0
VOICE CHANGE: 0
FACIAL SWELLING: 0
TROUBLE SWALLOWING: 0
COUGH: 0
VOMITING: 0
SHORTNESS OF BREATH: 0
SORE THROAT: 0
EYE ITCHING: 0
SINUS PRESSURE: 0
APNEA: 0

## 2019-12-18 ENCOUNTER — PROCEDURE VISIT (OUTPATIENT)
Dept: AUDIOLOGY | Age: 69
End: 2019-12-18
Payer: MEDICARE

## 2019-12-18 DIAGNOSIS — H93.12 TINNITUS, LEFT EAR: ICD-10-CM

## 2019-12-18 DIAGNOSIS — H90.3 SENSORINEURAL HEARING LOSS, BILATERAL: Primary | ICD-10-CM

## 2019-12-18 PROCEDURE — 92557 COMPREHENSIVE HEARING TEST: CPT | Performed by: AUDIOLOGIST

## 2020-02-28 ENCOUNTER — OFFICE VISIT (OUTPATIENT)
Dept: FAMILY MEDICINE CLINIC | Age: 70
End: 2020-02-28
Payer: MEDICARE

## 2020-02-28 VITALS
SYSTOLIC BLOOD PRESSURE: 130 MMHG | TEMPERATURE: 97.6 F | HEART RATE: 88 BPM | OXYGEN SATURATION: 98 % | DIASTOLIC BLOOD PRESSURE: 76 MMHG

## 2020-02-28 LAB
INFLUENZA A ANTIGEN, POC: NORMAL
INFLUENZA B ANTIGEN, POC: NORMAL

## 2020-02-28 PROCEDURE — 87804 INFLUENZA ASSAY W/OPTIC: CPT | Performed by: FAMILY MEDICINE

## 2020-02-28 PROCEDURE — 1036F TOBACCO NON-USER: CPT | Performed by: FAMILY MEDICINE

## 2020-02-28 PROCEDURE — G8482 FLU IMMUNIZE ORDER/ADMIN: HCPCS | Performed by: FAMILY MEDICINE

## 2020-02-28 PROCEDURE — 1123F ACP DISCUSS/DSCN MKR DOCD: CPT | Performed by: FAMILY MEDICINE

## 2020-02-28 PROCEDURE — 3017F COLORECTAL CA SCREEN DOC REV: CPT | Performed by: FAMILY MEDICINE

## 2020-02-28 PROCEDURE — G8417 CALC BMI ABV UP PARAM F/U: HCPCS | Performed by: FAMILY MEDICINE

## 2020-02-28 PROCEDURE — 99213 OFFICE O/P EST LOW 20 MIN: CPT | Performed by: FAMILY MEDICINE

## 2020-02-28 PROCEDURE — G8427 DOCREV CUR MEDS BY ELIG CLIN: HCPCS | Performed by: FAMILY MEDICINE

## 2020-02-28 PROCEDURE — 4040F PNEUMOC VAC/ADMIN/RCVD: CPT | Performed by: FAMILY MEDICINE

## 2020-02-28 RX ORDER — FLUTICASONE PROPIONATE 50 MCG
2 SPRAY, SUSPENSION (ML) NASAL DAILY
Qty: 1 BOTTLE | Refills: 5 | Status: SHIPPED | OUTPATIENT
Start: 2020-02-28 | End: 2021-06-10

## 2020-02-28 RX ORDER — FLUTICASONE PROPIONATE 50 MCG
SPRAY, SUSPENSION (ML) NASAL
Qty: 48 G | Refills: 1 | OUTPATIENT
Start: 2020-02-28

## 2020-02-28 ASSESSMENT — PATIENT HEALTH QUESTIONNAIRE - PHQ9
SUM OF ALL RESPONSES TO PHQ9 QUESTIONS 1 & 2: 0
SUM OF ALL RESPONSES TO PHQ QUESTIONS 1-9: 0
1. LITTLE INTEREST OR PLEASURE IN DOING THINGS: 0
2. FEELING DOWN, DEPRESSED OR HOPELESS: 0
SUM OF ALL RESPONSES TO PHQ QUESTIONS 1-9: 0

## 2020-02-28 ASSESSMENT — ENCOUNTER SYMPTOMS: COUGH: 1

## 2020-02-28 NOTE — PROGRESS NOTES
Jose Antonio Koch is a 71 y.o. male    Chief Complaint   Patient presents with    Congestion    Otalgia    Pharyngitis    Cough     productive dark gray       HPI:    Otalgia    There is pain in the left ear. This is a new problem. The current episode started yesterday. The problem has been unchanged. There has been no fever. Associated symptoms include coughing. He has tried nothing for the symptoms. Cough   This is a new problem. The current episode started yesterday. The problem has been unchanged. The cough is productive of sputum. Associated symptoms include ear pain. He has tried nothing for the symptoms. There is no history of asthma or COPD.       ROS:    Review of Systems   HENT: Positive for ear pain. Respiratory: Positive for cough. /76   Pulse 88   Temp 97.6 °F (36.4 °C) (Oral)   SpO2 98%     Physical Exam:    Physical Exam  Constitutional:       General: He is not in acute distress. Appearance: He is well-developed and normal weight. He is not toxic-appearing or diaphoretic. HENT:      Head: Normocephalic. Nose: Nose normal. No congestion or rhinorrhea. Mouth/Throat:      Mouth: Mucous membranes are moist.      Pharynx: Oropharynx is clear. No oropharyngeal exudate or posterior oropharyngeal erythema. Neck:      Musculoskeletal: Normal range of motion. No neck rigidity. Cardiovascular:      Rate and Rhythm: Normal rate and regular rhythm. Pulses: Normal pulses. Heart sounds: No murmur. Pulmonary:      Effort: Pulmonary effort is normal. No respiratory distress. Breath sounds: Normal breath sounds. No wheezing. Lymphadenopathy:      Cervical: No cervical adenopathy. Neurological:      Mental Status: He is alert. Psychiatric:         Mood and Affect: Mood normal.         Behavior: Behavior normal.         Thought Content:  Thought content normal.         Current Outpatient Medications   Medication Sig Dispense Refill    fluticasone (FLONASE)

## 2020-06-11 RX ORDER — SIMVASTATIN 40 MG
TABLET ORAL
Qty: 90 TABLET | Refills: 1 | Status: SHIPPED | OUTPATIENT
Start: 2020-06-11 | End: 2020-12-04

## 2020-06-30 ENCOUNTER — OFFICE VISIT (OUTPATIENT)
Dept: FAMILY MEDICINE CLINIC | Age: 70
End: 2020-06-30
Payer: MEDICARE

## 2020-06-30 VITALS
HEART RATE: 66 BPM | HEIGHT: 69 IN | OXYGEN SATURATION: 98 % | TEMPERATURE: 98 F | WEIGHT: 234 LBS | SYSTOLIC BLOOD PRESSURE: 126 MMHG | BODY MASS INDEX: 34.66 KG/M2 | DIASTOLIC BLOOD PRESSURE: 82 MMHG

## 2020-06-30 PROCEDURE — 1123F ACP DISCUSS/DSCN MKR DOCD: CPT | Performed by: FAMILY MEDICINE

## 2020-06-30 PROCEDURE — 99213 OFFICE O/P EST LOW 20 MIN: CPT | Performed by: FAMILY MEDICINE

## 2020-06-30 PROCEDURE — G8427 DOCREV CUR MEDS BY ELIG CLIN: HCPCS | Performed by: FAMILY MEDICINE

## 2020-06-30 PROCEDURE — G8417 CALC BMI ABV UP PARAM F/U: HCPCS | Performed by: FAMILY MEDICINE

## 2020-06-30 PROCEDURE — 1036F TOBACCO NON-USER: CPT | Performed by: FAMILY MEDICINE

## 2020-06-30 PROCEDURE — 3017F COLORECTAL CA SCREEN DOC REV: CPT | Performed by: FAMILY MEDICINE

## 2020-06-30 PROCEDURE — G0009 ADMIN PNEUMOCOCCAL VACCINE: HCPCS | Performed by: FAMILY MEDICINE

## 2020-06-30 PROCEDURE — 4040F PNEUMOC VAC/ADMIN/RCVD: CPT | Performed by: FAMILY MEDICINE

## 2020-06-30 PROCEDURE — 90732 PPSV23 VACC 2 YRS+ SUBQ/IM: CPT | Performed by: FAMILY MEDICINE

## 2020-06-30 PROCEDURE — G0439 PPPS, SUBSEQ VISIT: HCPCS | Performed by: FAMILY MEDICINE

## 2020-06-30 ASSESSMENT — LIFESTYLE VARIABLES: HOW OFTEN DO YOU HAVE A DRINK CONTAINING ALCOHOL: 0

## 2020-06-30 ASSESSMENT — PATIENT HEALTH QUESTIONNAIRE - PHQ9
SUM OF ALL RESPONSES TO PHQ QUESTIONS 1-9: 0
SUM OF ALL RESPONSES TO PHQ QUESTIONS 1-9: 0

## 2020-12-04 RX ORDER — SIMVASTATIN 40 MG
TABLET ORAL
Qty: 90 TABLET | Refills: 1 | Status: SHIPPED | OUTPATIENT
Start: 2020-12-04 | End: 2020-12-08 | Stop reason: SDUPTHER

## 2020-12-04 NOTE — TELEPHONE ENCOUNTER
.  Last office visit 6/30/2020     Last written 6- 90 with 1      Next office visit scheduled 12/8/2020    Requested Prescriptions     Pending Prescriptions Disp Refills    simvastatin (ZOCOR) 40 MG tablet [Pharmacy Med Name: SIMVASTATIN 40MG TABLETS] 90 tablet 1     Sig: TAKE 1 TABLET BY MOUTH EVERY EVENING

## 2020-12-08 ENCOUNTER — OFFICE VISIT (OUTPATIENT)
Dept: FAMILY MEDICINE CLINIC | Age: 70
End: 2020-12-08
Payer: MEDICARE

## 2020-12-08 VITALS
WEIGHT: 248.6 LBS | HEART RATE: 86 BPM | DIASTOLIC BLOOD PRESSURE: 70 MMHG | BODY MASS INDEX: 36.71 KG/M2 | SYSTOLIC BLOOD PRESSURE: 120 MMHG | TEMPERATURE: 97.5 F | OXYGEN SATURATION: 98 %

## 2020-12-08 PROBLEM — E78.5 HYPERLIPIDEMIA: Status: ACTIVE | Noted: 2020-12-08

## 2020-12-08 LAB
A/G RATIO: 1.3 (ref 1.1–2.2)
ALBUMIN SERPL-MCNC: 4 G/DL (ref 3.4–5)
ALP BLD-CCNC: 58 U/L (ref 40–129)
ALT SERPL-CCNC: 16 U/L (ref 10–40)
ANION GAP SERPL CALCULATED.3IONS-SCNC: 13 MMOL/L (ref 3–16)
AST SERPL-CCNC: 18 U/L (ref 15–37)
BASOPHILS ABSOLUTE: 0 K/UL (ref 0–0.2)
BASOPHILS RELATIVE PERCENT: 0.8 %
BILIRUB SERPL-MCNC: 0.5 MG/DL (ref 0–1)
BUN BLDV-MCNC: 18 MG/DL (ref 7–20)
CALCIUM SERPL-MCNC: 9.1 MG/DL (ref 8.3–10.6)
CHLORIDE BLD-SCNC: 102 MMOL/L (ref 99–110)
CHOLESTEROL, TOTAL: 167 MG/DL (ref 0–199)
CO2: 25 MMOL/L (ref 21–32)
CREAT SERPL-MCNC: 1.1 MG/DL (ref 0.8–1.3)
EOSINOPHILS ABSOLUTE: 0.4 K/UL (ref 0–0.6)
EOSINOPHILS RELATIVE PERCENT: 8.7 %
GFR AFRICAN AMERICAN: >60
GFR NON-AFRICAN AMERICAN: >60
GLOBULIN: 3.1 G/DL
GLUCOSE BLD-MCNC: 107 MG/DL (ref 70–99)
HCT VFR BLD CALC: 40.6 % (ref 40.5–52.5)
HDLC SERPL-MCNC: 46 MG/DL (ref 40–60)
HEMOGLOBIN: 13.7 G/DL (ref 13.5–17.5)
LDL CHOLESTEROL CALCULATED: 101 MG/DL
LYMPHOCYTES ABSOLUTE: 1.2 K/UL (ref 1–5.1)
LYMPHOCYTES RELATIVE PERCENT: 24.8 %
MCH RBC QN AUTO: 29.9 PG (ref 26–34)
MCHC RBC AUTO-ENTMCNC: 33.8 G/DL (ref 31–36)
MCV RBC AUTO: 88.4 FL (ref 80–100)
MONOCYTES ABSOLUTE: 0.3 K/UL (ref 0–1.3)
MONOCYTES RELATIVE PERCENT: 6.3 %
NEUTROPHILS ABSOLUTE: 2.8 K/UL (ref 1.7–7.7)
NEUTROPHILS RELATIVE PERCENT: 59.4 %
PDW BLD-RTO: 13.8 % (ref 12.4–15.4)
PLATELET # BLD: 181 K/UL (ref 135–450)
PMV BLD AUTO: 8.9 FL (ref 5–10.5)
POTASSIUM SERPL-SCNC: 4.4 MMOL/L (ref 3.5–5.1)
RBC # BLD: 4.59 M/UL (ref 4.2–5.9)
SODIUM BLD-SCNC: 140 MMOL/L (ref 136–145)
TOTAL PROTEIN: 7.1 G/DL (ref 6.4–8.2)
TRIGL SERPL-MCNC: 99 MG/DL (ref 0–150)
VLDLC SERPL CALC-MCNC: 20 MG/DL
WBC # BLD: 4.8 K/UL (ref 4–11)

## 2020-12-08 PROCEDURE — G8427 DOCREV CUR MEDS BY ELIG CLIN: HCPCS | Performed by: FAMILY MEDICINE

## 2020-12-08 PROCEDURE — 4040F PNEUMOC VAC/ADMIN/RCVD: CPT | Performed by: FAMILY MEDICINE

## 2020-12-08 PROCEDURE — G8482 FLU IMMUNIZE ORDER/ADMIN: HCPCS | Performed by: FAMILY MEDICINE

## 2020-12-08 PROCEDURE — 99213 OFFICE O/P EST LOW 20 MIN: CPT | Performed by: FAMILY MEDICINE

## 2020-12-08 PROCEDURE — G8417 CALC BMI ABV UP PARAM F/U: HCPCS | Performed by: FAMILY MEDICINE

## 2020-12-08 PROCEDURE — 3017F COLORECTAL CA SCREEN DOC REV: CPT | Performed by: FAMILY MEDICINE

## 2020-12-08 PROCEDURE — 1123F ACP DISCUSS/DSCN MKR DOCD: CPT | Performed by: FAMILY MEDICINE

## 2020-12-08 PROCEDURE — 1036F TOBACCO NON-USER: CPT | Performed by: FAMILY MEDICINE

## 2020-12-08 RX ORDER — SIMVASTATIN 40 MG
TABLET ORAL
Qty: 90 TABLET | Refills: 3 | Status: SHIPPED | OUTPATIENT
Start: 2020-12-08 | End: 2021-12-10 | Stop reason: SDUPTHER

## 2020-12-08 NOTE — PROGRESS NOTES
Bebo Colon is a 79 y.o. male    Chief Complaint   Patient presents with    6 Month Follow-Up       HPI:    Hyperlipidemia   This is a chronic problem. The current episode started more than 1 year ago. The problem is controlled. Recent lipid tests were reviewed and are normal. He has no history of diabetes. Pertinent negatives include no myalgias. Current antihyperlipidemic treatment includes statins. The current treatment provides significant improvement of lipids. There are no compliance problems. Risk factors for coronary artery disease include male sex. ROS:    Review of Systems   Musculoskeletal: Negative for myalgias. /70 (Site: Left Upper Arm, Position: Sitting, Cuff Size: Large Adult)   Pulse 86   Temp 97.5 °F (36.4 °C) (Temporal)   Wt 248 lb 9.6 oz (112.8 kg)   SpO2 98%   BMI 36.71 kg/m²     Physical Exam:    Physical Exam  Constitutional:       General: He is not in acute distress. Appearance: Normal appearance. He is obese. He is not ill-appearing or toxic-appearing. HENT:      Head: Normocephalic. Neurological:      Mental Status: He is alert. Psychiatric:         Mood and Affect: Mood normal.         Behavior: Behavior normal.         Thought Content: Thought content normal.         Current Outpatient Medications   Medication Sig Dispense Refill    simvastatin (ZOCOR) 40 MG tablet TAKE 1 TABLET BY MOUTH EVERY EVENING 90 tablet 3    fluticasone (FLONASE) 50 MCG/ACT nasal spray 2 sprays by Each Nostril route daily 1 Bottle 5     No current facility-administered medications for this visit. Assessment:    1. Hyperlipidemia, unspecified hyperlipidemia type    2. Hyperglycemia        Plan:    1. Hyperlipidemia, unspecified hyperlipidemia type  Stable. Continue current medications. - CBC Auto Differential  - Comprehensive Metabolic Panel  - Lipid Panel  - simvastatin (ZOCOR) 40 MG tablet; TAKE 1 TABLET BY MOUTH EVERY EVENING  Dispense: 90 tablet; Refill: 3    2. Hyperglycemia  - Hemoglobin A1C      Return in about 1 year (around 12/8/2021) for medicare AWV.

## 2020-12-09 LAB
ESTIMATED AVERAGE GLUCOSE: 114 MG/DL
HBA1C MFR BLD: 5.6 %

## 2021-06-09 ENCOUNTER — TELEPHONE (OUTPATIENT)
Dept: FAMILY MEDICINE CLINIC | Age: 71
End: 2021-06-09

## 2021-06-09 NOTE — TELEPHONE ENCOUNTER
----- Message from Edith Jenkins sent at 6/9/2021  1:39 PM EDT -----  Subject: Message to Provider    QUESTIONS  Information for Provider? pt would like to schedule an appointment for   sinus infection. (runny nose, sore throat, left ear ache)  ---------------------------------------------------------------------------  --------------  CALL BACK INFO  What is the best way for the office to contact you? OK to leave message on   voicemail  Preferred Call Back Phone Number? [phone]  ---------------------------------------------------------------------------  --------------  SCRIPT ANSWERS  Relationship to Patient? Self  Appointment reason? Symptomatic  Select script based on patient symptoms? Adult Cough/Cold Symptoms [Runny   Nose, Sore Throat, Flu, Sinus, Sinus Infection, Upper Respiratory   Infection [URI], Congestion]   Are you currently unable to finish sentences due to any difficulty   breathing? No  Are you unable to swallow liquids? No  Are you having fevers (100.4 or greater), chills, or sweats? No  Do you have COPD, asthma or a chronic lung condition? No  Have your symptoms been present for more than 5 days?  No

## 2021-06-10 ENCOUNTER — OFFICE VISIT (OUTPATIENT)
Dept: FAMILY MEDICINE CLINIC | Age: 71
End: 2021-06-10
Payer: MEDICARE

## 2021-06-10 VITALS
TEMPERATURE: 98.6 F | WEIGHT: 235.4 LBS | HEART RATE: 82 BPM | DIASTOLIC BLOOD PRESSURE: 70 MMHG | SYSTOLIC BLOOD PRESSURE: 130 MMHG | BODY MASS INDEX: 34.76 KG/M2 | OXYGEN SATURATION: 97 %

## 2021-06-10 DIAGNOSIS — H69.83 EUSTACHIAN TUBE DYSFUNCTION, BILATERAL: ICD-10-CM

## 2021-06-10 DIAGNOSIS — H66.002 ACUTE SUPPURATIVE OTITIS MEDIA OF LEFT EAR WITHOUT SPONTANEOUS RUPTURE OF TYMPANIC MEMBRANE, RECURRENCE NOT SPECIFIED: Primary | ICD-10-CM

## 2021-06-10 PROCEDURE — 4040F PNEUMOC VAC/ADMIN/RCVD: CPT | Performed by: NURSE PRACTITIONER

## 2021-06-10 PROCEDURE — 3017F COLORECTAL CA SCREEN DOC REV: CPT | Performed by: NURSE PRACTITIONER

## 2021-06-10 PROCEDURE — G8417 CALC BMI ABV UP PARAM F/U: HCPCS | Performed by: NURSE PRACTITIONER

## 2021-06-10 PROCEDURE — 1036F TOBACCO NON-USER: CPT | Performed by: NURSE PRACTITIONER

## 2021-06-10 PROCEDURE — 99213 OFFICE O/P EST LOW 20 MIN: CPT | Performed by: NURSE PRACTITIONER

## 2021-06-10 PROCEDURE — 1123F ACP DISCUSS/DSCN MKR DOCD: CPT | Performed by: NURSE PRACTITIONER

## 2021-06-10 PROCEDURE — G8427 DOCREV CUR MEDS BY ELIG CLIN: HCPCS | Performed by: NURSE PRACTITIONER

## 2021-06-10 RX ORDER — TRIAMCINOLONE ACETONIDE 55 UG/1
2 SPRAY, METERED NASAL DAILY
Qty: 1 INHALER | Refills: 0 | Status: SHIPPED | OUTPATIENT
Start: 2021-06-10

## 2021-06-10 RX ORDER — AMOXICILLIN 500 MG/1
500 CAPSULE ORAL 2 TIMES DAILY
Qty: 20 CAPSULE | Refills: 0 | Status: SHIPPED | OUTPATIENT
Start: 2021-06-10 | End: 2021-06-20

## 2021-06-10 SDOH — ECONOMIC STABILITY: FOOD INSECURITY: WITHIN THE PAST 12 MONTHS, YOU WORRIED THAT YOUR FOOD WOULD RUN OUT BEFORE YOU GOT MONEY TO BUY MORE.: NEVER TRUE

## 2021-06-10 SDOH — ECONOMIC STABILITY: FOOD INSECURITY: WITHIN THE PAST 12 MONTHS, THE FOOD YOU BOUGHT JUST DIDN'T LAST AND YOU DIDN'T HAVE MONEY TO GET MORE.: NEVER TRUE

## 2021-06-10 ASSESSMENT — SOCIAL DETERMINANTS OF HEALTH (SDOH): HOW HARD IS IT FOR YOU TO PAY FOR THE VERY BASICS LIKE FOOD, HOUSING, MEDICAL CARE, AND HEATING?: NOT HARD AT ALL

## 2021-06-10 ASSESSMENT — ENCOUNTER SYMPTOMS
SINUS PRESSURE: 0
EYES NEGATIVE: 1
RESPIRATORY NEGATIVE: 1
RHINORRHEA: 0
SINUS PAIN: 0
TROUBLE SWALLOWING: 0
FACIAL SWELLING: 0
VOICE CHANGE: 0
SORE THROAT: 0

## 2021-06-10 ASSESSMENT — PATIENT HEALTH QUESTIONNAIRE - PHQ9
1. LITTLE INTEREST OR PLEASURE IN DOING THINGS: 0
SUM OF ALL RESPONSES TO PHQ QUESTIONS 1-9: 0
SUM OF ALL RESPONSES TO PHQ QUESTIONS 1-9: 0
SUM OF ALL RESPONSES TO PHQ9 QUESTIONS 1 & 2: 0
2. FEELING DOWN, DEPRESSED OR HOPELESS: 0
SUM OF ALL RESPONSES TO PHQ QUESTIONS 1-9: 0

## 2021-06-10 NOTE — PROGRESS NOTES
is not perforated. Left Ear: Hearing, ear canal and external ear normal. A middle ear effusion is present. There is no impacted cerumen. Tympanic membrane is not perforated, erythematous or bulging. Nose: Nose normal.      Mouth/Throat:      Mouth: Mucous membranes are moist.      Pharynx: Oropharynx is clear. No oropharyngeal exudate or posterior oropharyngeal erythema. Eyes:      Conjunctiva/sclera: Conjunctivae normal.      Pupils: Pupils are equal, round, and reactive to light. Pulmonary:      Effort: Pulmonary effort is normal.      Breath sounds: Normal breath sounds. Musculoskeletal:      Cervical back: Normal range of motion. Skin:     General: Skin is warm and dry. Neurological:      Mental Status: He is alert and oriented to person, place, and time. ASSESSMENT/PLAN:  1. Eustachian tube dysfunction, bilateral  Advised pt to use nasacort as prescribed and f/u if symptoms persist or worsen. - triamcinolone (NASACORT) 55 MCG/ACT nasal inhaler; 2 sprays by Each Nostril route daily  Dispense: 1 Inhaler; Refill: 0    2. Acute suppurative otitis media of left ear without spontaneous rupture of tympanic membrane, recurrence not specified  Advised pt to take medications as prescribed and f/u if symptoms or worsen. - triamcinolone (NASACORT) 55 MCG/ACT nasal inhaler; 2 sprays by Each Nostril route daily  Dispense: 1 Inhaler; Refill: 0  - amoxicillin (AMOXIL) 500 MG capsule; Take 1 capsule by mouth 2 times daily for 10 days  Dispense: 20 capsule; Refill: 0      Return if symptoms worsen or fail to improve. An electronic signature was used to authenticate this note.     --CYDNEY Duong - CNP on 6/10/2021 at 10:19 AM

## 2021-06-10 NOTE — PATIENT INSTRUCTIONS
be sure to contact your doctor if:    · You have new or worse symptoms.     · You are not getting better after taking an antibiotic for 2 days. Where can you learn more? Go to https://RenewDatapeBaubleBar.[employer] org and sign in to your Alphion account. Enter N815 in the [facility] box to learn more about \"Ear Infection (Otitis Media): Care Instructions. \"     If you do not have an account, please click on the \"Sign Up Now\" link. Current as of: December 2, 2020               Content Version: 12.8  © 2006-2021 PowerMessage. Care instructions adapted under license by [facility] ([facility]). If you have questions about a medical condition or this instruction, always ask your healthcare professional. [place] [address] any warranty or liability for your use of this information. Patient Education        Eustachian Tube Problems: Care Instructions  Your Care Instructions     The eustachian (say \"you-STAY-shee-un\") tubes run between the inside of the ears and the throat. They keep air pressure stable in the ears. If your eustachian tubes become blocked, the air pressure in your ears changes. The fluids from a cold can clog eustachian tubes, causing pain in the ears. A quick change in air pressure can cause eustachian tubes to close up. This might happen when an airplane changes altitude or when a  goes up or down underwater. Eustachian tube problems often clear up on their own or after antibiotic treatment. If your tubes continue to be blocked, you may need surgery. Follow-up care is a key part of your treatment and safety. Be sure to make and go to all appointments, and call your doctor if you are having problems. It's also a good idea to know your test results and keep a list of the medicines you take. How can you care for yourself at home? · To ease ear pain, apply a warm washcloth or a heating pad set on low.  There may be some drainage from the ear when the heat melts earwax. Put a cloth between the heat source and your skin. Do not use a heating pad with children. · If your doctor prescribed antibiotics, take them as directed. Do not stop taking them just because you feel better. You need to take the full course of antibiotics. · Your doctor may recommend over-the-counter medicine. Be safe with medicines. Oral or nasal decongestants may relieve ear pain. Avoid decongestants that are combined with antihistamines, which tend to cause more blockage. But if allergies seem to be the problem, your doctor may recommend a combination. Be careful with cough and cold medicines. Don't give them to children younger than 6, because they don't work for children that age and can even be harmful. For children 6 and older, always follow all the instructions carefully. Make sure you know how much medicine to give and how long to use it. And use the dosing device if one is included. When should you call for help? Call your doctor now or seek immediate medical care if:    · You develop sudden, complete hearing loss.     · You have severe pain or feel dizzy.     · You have new or increasing pus or blood draining from your ear.     · You have redness, swelling, or pain around or behind the ear. Watch closely for changes in your health, and be sure to contact your doctor if:    · You do not get better after 2 weeks.     · You have any new symptoms, such as itching or a feeling of fullness in the ear. Where can you learn more? Go to https://Futubra.[employer]. org and sign in to your Secure Command account. Enter Y822 in the [facility] box to learn more about \"Eustachian Tube Problems: Care Instructions. \"     If you do not have an account, please click on the \"Sign Up Now\" link. Current as of: December 2, 2020               Content Version: 12.8  © 4310-3965 Healthwise, Incorporated. Care instructions adapted under license by [employer] [facility] ([facility]).  If you have questions about a medical condition or this instruction, always ask your healthcare professional. [place] [address] any warranty or liability for your use of this information.

## 2021-08-13 NOTE — TELEPHONE ENCOUNTER
.  Last office visit 8/31/2018     Last written      Next office visit scheduled Visit date not found    Requested Prescriptions     Pending Prescriptions Disp Refills    furosemide (LASIX) 20 MG tablet [Pharmacy Med Name: FUROSEMIDE 20MG TABLETS] 90 tablet 0     Sig: TAKE 1 TABLET BY MOUTH DAILY AS NEEDED FOR SWELLING [address] [address],[address] [facility] [address]  Phone: [phone]  Fax: [phone]    Claude Berry MD          August 13, 2021     Patient: Yohannes Pereira   YOB: 1961   Date of Visit: 8/13/2021       To Whom It May Concern: It is my medical opinion that Marilee Cords that be excused from court appearance 8/31/2021. Ms. Yuliana Sotelo suffered a stroke in 2016 and has residual cognitive deficits with retrograde amnesia. If you have any questions or concerns, please don't hesitate to call.     Sincerely,            Claude Berry MD

## 2021-12-10 ENCOUNTER — OFFICE VISIT (OUTPATIENT)
Dept: FAMILY MEDICINE CLINIC | Age: 71
End: 2021-12-10
Payer: MEDICARE

## 2021-12-10 VITALS
OXYGEN SATURATION: 97 % | SYSTOLIC BLOOD PRESSURE: 130 MMHG | DIASTOLIC BLOOD PRESSURE: 76 MMHG | BODY MASS INDEX: 36.92 KG/M2 | WEIGHT: 250 LBS | HEART RATE: 70 BPM

## 2021-12-10 DIAGNOSIS — E78.00 PURE HYPERCHOLESTEROLEMIA: ICD-10-CM

## 2021-12-10 DIAGNOSIS — Z00.00 ROUTINE GENERAL MEDICAL EXAMINATION AT A HEALTH CARE FACILITY: Primary | ICD-10-CM

## 2021-12-10 DIAGNOSIS — E55.9 VITAMIN D DEFICIENCY: ICD-10-CM

## 2021-12-10 LAB
A/G RATIO: 1.4 (ref 1.1–2.2)
ALBUMIN SERPL-MCNC: 4.1 G/DL (ref 3.4–5)
ALP BLD-CCNC: 62 U/L (ref 40–129)
ALT SERPL-CCNC: 18 U/L (ref 10–40)
ANION GAP SERPL CALCULATED.3IONS-SCNC: 13 MMOL/L (ref 3–16)
AST SERPL-CCNC: 21 U/L (ref 15–37)
BASOPHILS ABSOLUTE: 0 K/UL (ref 0–0.2)
BASOPHILS RELATIVE PERCENT: 1 %
BILIRUB SERPL-MCNC: 0.5 MG/DL (ref 0–1)
BUN BLDV-MCNC: 22 MG/DL (ref 7–20)
CALCIUM SERPL-MCNC: 9 MG/DL (ref 8.3–10.6)
CHLORIDE BLD-SCNC: 101 MMOL/L (ref 99–110)
CHOLESTEROL, TOTAL: 149 MG/DL (ref 0–199)
CO2: 24 MMOL/L (ref 21–32)
CREAT SERPL-MCNC: 1 MG/DL (ref 0.8–1.3)
EOSINOPHILS ABSOLUTE: 0.4 K/UL (ref 0–0.6)
EOSINOPHILS RELATIVE PERCENT: 10.1 %
FOLATE: 5.2 NG/ML (ref 4.78–24.2)
GFR AFRICAN AMERICAN: >60
GFR NON-AFRICAN AMERICAN: >60
GLUCOSE BLD-MCNC: 95 MG/DL (ref 70–99)
HCT VFR BLD CALC: 41 % (ref 40.5–52.5)
HDLC SERPL-MCNC: 46 MG/DL (ref 40–60)
HEMOGLOBIN: 13.4 G/DL (ref 13.5–17.5)
LDL CHOLESTEROL CALCULATED: 87 MG/DL
LYMPHOCYTES ABSOLUTE: 1 K/UL (ref 1–5.1)
LYMPHOCYTES RELATIVE PERCENT: 23.9 %
MCH RBC QN AUTO: 29.2 PG (ref 26–34)
MCHC RBC AUTO-ENTMCNC: 32.7 G/DL (ref 31–36)
MCV RBC AUTO: 89.1 FL (ref 80–100)
MONOCYTES ABSOLUTE: 0.3 K/UL (ref 0–1.3)
MONOCYTES RELATIVE PERCENT: 6.2 %
NEUTROPHILS ABSOLUTE: 2.5 K/UL (ref 1.7–7.7)
NEUTROPHILS RELATIVE PERCENT: 58.8 %
PDW BLD-RTO: 14 % (ref 12.4–15.4)
PLATELET # BLD: 168 K/UL (ref 135–450)
PMV BLD AUTO: 9.3 FL (ref 5–10.5)
POTASSIUM SERPL-SCNC: 4.3 MMOL/L (ref 3.5–5.1)
RBC # BLD: 4.6 M/UL (ref 4.2–5.9)
SODIUM BLD-SCNC: 138 MMOL/L (ref 136–145)
TOTAL PROTEIN: 7.1 G/DL (ref 6.4–8.2)
TRIGL SERPL-MCNC: 82 MG/DL (ref 0–150)
TSH REFLEX: 3.35 UIU/ML (ref 0.27–4.2)
VITAMIN B-12: 296 PG/ML (ref 211–911)
VITAMIN D 25-HYDROXY: 42 NG/ML
VLDLC SERPL CALC-MCNC: 16 MG/DL
WBC # BLD: 4.2 K/UL (ref 4–11)

## 2021-12-10 PROCEDURE — 3017F COLORECTAL CA SCREEN DOC REV: CPT | Performed by: FAMILY MEDICINE

## 2021-12-10 PROCEDURE — G8482 FLU IMMUNIZE ORDER/ADMIN: HCPCS | Performed by: FAMILY MEDICINE

## 2021-12-10 PROCEDURE — G0439 PPPS, SUBSEQ VISIT: HCPCS | Performed by: FAMILY MEDICINE

## 2021-12-10 PROCEDURE — 1123F ACP DISCUSS/DSCN MKR DOCD: CPT | Performed by: FAMILY MEDICINE

## 2021-12-10 PROCEDURE — 4040F PNEUMOC VAC/ADMIN/RCVD: CPT | Performed by: FAMILY MEDICINE

## 2021-12-10 RX ORDER — SIMVASTATIN 40 MG
TABLET ORAL
Qty: 90 TABLET | Refills: 3 | Status: SHIPPED | OUTPATIENT
Start: 2021-12-10

## 2021-12-10 ASSESSMENT — LIFESTYLE VARIABLES
HOW OFTEN DO YOU HAVE A DRINK CONTAINING ALCOHOL: NEVER
HOW OFTEN DO YOU HAVE A DRINK CONTAINING ALCOHOL: 0
AUDIT TOTAL SCORE: INCOMPLETE
AUDIT-C TOTAL SCORE: INCOMPLETE

## 2021-12-10 ASSESSMENT — PATIENT HEALTH QUESTIONNAIRE - PHQ9
1. LITTLE INTEREST OR PLEASURE IN DOING THINGS: 0
SUM OF ALL RESPONSES TO PHQ QUESTIONS 1-9: 0
SUM OF ALL RESPONSES TO PHQ QUESTIONS 1-9: 0
2. FEELING DOWN, DEPRESSED OR HOPELESS: 0
SUM OF ALL RESPONSES TO PHQ QUESTIONS 1-9: 0
SUM OF ALL RESPONSES TO PHQ9 QUESTIONS 1 & 2: 0

## 2021-12-10 NOTE — PATIENT INSTRUCTIONS
Personalized Preventive Plan for [facility] - 12/10/2021  Medicare offers a range of preventive health benefits. Some of the tests and screenings are paid in full while other may be subject to a deductible, co-insurance, and/or copay. Some of these benefits include a comprehensive review of your medical history including lifestyle, illnesses that may run in your family, and various assessments and screenings as appropriate. After reviewing your medical record and screening and assessments performed today your provider may have ordered immunizations, labs, imaging, and/or referrals for you. A list of these orders (if applicable) as well as your Preventive Care list are included within your After Visit Summary for your review. Other Preventive Recommendations:    · A preventive eye exam performed by an eye specialist is recommended every 1-2 years to screen for glaucoma; cataracts, macular degeneration, and other eye disorders. · A preventive dental visit is recommended every 6 months. · Try to get at least 150 minutes of exercise per week or 10,000 steps per day on a pedometer . · Order or download the FREE \"Exercise & Physical Activity: Your Everyday Guide\" from The [employer]. Call 9-700.481.3404 or search The [employer] online. · You need 8884-5317 mg of calcium and 5412-1674 IU of vitamin D per day. It is possible to meet your calcium requirement with diet alone, but a vitamin D supplement is usually necessary to meet this goal.  · When exposed to the sun, use a sunscreen that protects against both UVA and UVB radiation with an SPF of 30 or greater. Reapply every 2 to 3 hours or after sweating, drying off with a towel, or swimming. · Always wear a seat belt when traveling in a car. Always wear a helmet when riding a bicycle or motorcycle.

## 2022-08-23 ENCOUNTER — OFFICE VISIT (OUTPATIENT)
Dept: FAMILY MEDICINE CLINIC | Age: 72
End: 2022-08-23
Payer: MEDICARE

## 2022-08-23 VITALS
HEART RATE: 77 BPM | SYSTOLIC BLOOD PRESSURE: 136 MMHG | BODY MASS INDEX: 36.92 KG/M2 | WEIGHT: 250 LBS | OXYGEN SATURATION: 97 % | DIASTOLIC BLOOD PRESSURE: 78 MMHG

## 2022-08-23 DIAGNOSIS — R07.81 RIB PAIN ON RIGHT SIDE: Primary | ICD-10-CM

## 2022-08-23 DIAGNOSIS — S46.811A STRAIN OF RIGHT DELTOID MUSCLE, INITIAL ENCOUNTER: ICD-10-CM

## 2022-08-23 PROCEDURE — 99213 OFFICE O/P EST LOW 20 MIN: CPT | Performed by: FAMILY MEDICINE

## 2022-08-23 PROCEDURE — 1123F ACP DISCUSS/DSCN MKR DOCD: CPT | Performed by: FAMILY MEDICINE

## 2022-08-23 RX ORDER — TIZANIDINE 2 MG/1
2 TABLET ORAL NIGHTLY PRN
Qty: 30 TABLET | Refills: 0 | Status: SHIPPED | OUTPATIENT
Start: 2022-08-23

## 2022-08-23 SDOH — ECONOMIC STABILITY: HOUSING INSECURITY: IN THE LAST 12 MONTHS, HOW MANY PLACES HAVE YOU LIVED?: 1

## 2022-08-23 SDOH — ECONOMIC STABILITY: FOOD INSECURITY: WITHIN THE PAST 12 MONTHS, YOU WORRIED THAT YOUR FOOD WOULD RUN OUT BEFORE YOU GOT MONEY TO BUY MORE.: NEVER TRUE

## 2022-08-23 SDOH — ECONOMIC STABILITY: FOOD INSECURITY: WITHIN THE PAST 12 MONTHS, THE FOOD YOU BOUGHT JUST DIDN'T LAST AND YOU DIDN'T HAVE MONEY TO GET MORE.: NEVER TRUE

## 2022-08-23 SDOH — ECONOMIC STABILITY: INCOME INSECURITY: IN THE LAST 12 MONTHS, WAS THERE A TIME WHEN YOU WERE NOT ABLE TO PAY THE MORTGAGE OR RENT ON TIME?: NO

## 2022-08-23 SDOH — ECONOMIC STABILITY: TRANSPORTATION INSECURITY
IN THE PAST 12 MONTHS, HAS LACK OF TRANSPORTATION KEPT YOU FROM MEETINGS, WORK, OR FROM GETTING THINGS NEEDED FOR DAILY LIVING?: NO

## 2022-08-23 SDOH — ECONOMIC STABILITY: HOUSING INSECURITY
IN THE LAST 12 MONTHS, WAS THERE A TIME WHEN YOU DID NOT HAVE A STEADY PLACE TO SLEEP OR SLEPT IN A SHELTER (INCLUDING NOW)?: NO

## 2022-08-23 SDOH — ECONOMIC STABILITY: TRANSPORTATION INSECURITY
IN THE PAST 12 MONTHS, HAS THE LACK OF TRANSPORTATION KEPT YOU FROM MEDICAL APPOINTMENTS OR FROM GETTING MEDICATIONS?: NO

## 2022-08-23 ASSESSMENT — PATIENT HEALTH QUESTIONNAIRE - PHQ9
SUM OF ALL RESPONSES TO PHQ QUESTIONS 1-9: 0
7. TROUBLE CONCENTRATING ON THINGS, SUCH AS READING THE NEWSPAPER OR WATCHING TELEVISION: 0
SUM OF ALL RESPONSES TO PHQ QUESTIONS 1-9: 0
4. FEELING TIRED OR HAVING LITTLE ENERGY: 0
8. MOVING OR SPEAKING SO SLOWLY THAT OTHER PEOPLE COULD HAVE NOTICED. OR THE OPPOSITE, BEING SO FIGETY OR RESTLESS THAT YOU HAVE BEEN MOVING AROUND A LOT MORE THAN USUAL: 0
SUM OF ALL RESPONSES TO PHQ QUESTIONS 1-9: 0
SUM OF ALL RESPONSES TO PHQ9 QUESTIONS 1 & 2: 0
1. LITTLE INTEREST OR PLEASURE IN DOING THINGS: 0
SUM OF ALL RESPONSES TO PHQ QUESTIONS 1-9: 0
2. FEELING DOWN, DEPRESSED OR HOPELESS: 0
5. POOR APPETITE OR OVEREATING: 0
6. FEELING BAD ABOUT YOURSELF - OR THAT YOU ARE A FAILURE OR HAVE LET YOURSELF OR YOUR FAMILY DOWN: 0
9. THOUGHTS THAT YOU WOULD BE BETTER OFF DEAD, OR OF HURTING YOURSELF: 0
10. IF YOU CHECKED OFF ANY PROBLEMS, HOW DIFFICULT HAVE THESE PROBLEMS MADE IT FOR YOU TO DO YOUR WORK, TAKE CARE OF THINGS AT HOME, OR GET ALONG WITH OTHER PEOPLE: 0
3. TROUBLE FALLING OR STAYING ASLEEP: 0

## 2022-08-23 ASSESSMENT — LIFESTYLE VARIABLES
HOW OFTEN DO YOU HAVE A DRINK CONTAINING ALCOHOL: NEVER
HOW MANY STANDARD DRINKS CONTAINING ALCOHOL DO YOU HAVE ON A TYPICAL DAY: PATIENT DOES NOT DRINK

## 2022-08-23 ASSESSMENT — SOCIAL DETERMINANTS OF HEALTH (SDOH): HOW HARD IS IT FOR YOU TO PAY FOR THE VERY BASICS LIKE FOOD, HOUSING, MEDICAL CARE, AND HEATING?: NOT HARD AT ALL

## 2022-08-23 ASSESSMENT — ANXIETY QUESTIONNAIRES
IF YOU CHECKED OFF ANY PROBLEMS ON THIS QUESTIONNAIRE, HOW DIFFICULT HAVE THESE PROBLEMS MADE IT FOR YOU TO DO YOUR WORK, TAKE CARE OF THINGS AT HOME, OR GET ALONG WITH OTHER PEOPLE: NOT DIFFICULT AT ALL
GAD7 TOTAL SCORE: 0
1. FEELING NERVOUS, ANXIOUS, OR ON EDGE: 0
6. BECOMING EASILY ANNOYED OR IRRITABLE: 0
4. TROUBLE RELAXING: 0
5. BEING SO RESTLESS THAT IT IS HARD TO SIT STILL: 0
3. WORRYING TOO MUCH ABOUT DIFFERENT THINGS: 0
7. FEELING AFRAID AS IF SOMETHING AWFUL MIGHT HAPPEN: 0
2. NOT BEING ABLE TO STOP OR CONTROL WORRYING: 0

## 2022-08-23 ASSESSMENT — ENCOUNTER SYMPTOMS: BACK PAIN: 1

## 2022-08-23 NOTE — PROGRESS NOTES
Janay Pompa is a 70 y.o. male    Chief Complaint   Patient presents with    Back Pain     Right side     Shoulder Pain     Right side        HPI:    Back Pain  This is a new problem. The current episode started 1 to 4 weeks ago. The problem occurs daily. The problem has been gradually worsening since onset. Pain location: right rib and right shoulder. Pertinent negatives include no tingling. Risk factors include obesity. He has tried nothing for the symptoms. ROS:    Review of Systems   Musculoskeletal:  Positive for back pain. Neurological:  Negative for tingling. /78   Pulse 77   Wt 250 lb (113.4 kg)   SpO2 97%   BMI 36.92 kg/m²     Physical Exam:    Physical Exam  Constitutional:       General: He is not in acute distress. Appearance: Normal appearance. He is obese. He is not ill-appearing or toxic-appearing. HENT:      Head: Normocephalic. Neurological:      Mental Status: He is alert. Psychiatric:         Mood and Affect: Mood normal.         Behavior: Behavior normal.         Thought Content: Thought content normal.   +TTP over right rib cage and right deltoid muscle. Current Outpatient Medications   Medication Sig Dispense Refill    tiZANidine (ZANAFLEX) 2 MG tablet Take 1 tablet by mouth nightly as needed (muscle spasms) 30 tablet 0    simvastatin (ZOCOR) 40 MG tablet TAKE 1 TABLET BY MOUTH EVERY EVENING 90 tablet 3    triamcinolone (NASACORT) 55 MCG/ACT nasal inhaler 2 sprays by Each Nostril route daily (Patient not taking: Reported on 8/23/2022) 1 Inhaler 0     No current facility-administered medications for this visit. Assessment:    1. Rib pain on right side    2. Strain of right deltoid muscle, initial encounter        Plan:    1. Rib pain on right side  Apply Biofreeze and take Tylenol as needed for pain. Cautioned on sedation with the muscle relaxer. - tiZANidine (ZANAFLEX) 2 MG tablet;  Take 1 tablet by mouth nightly as needed (muscle spasms)  Dispense: 30 tablet; Refill: 0    2. Strain of right deltoid muscle, initial encounter  As above. Look up exercises as well online. - tiZANidine (ZANAFLEX) 2 MG tablet; Take 1 tablet by mouth nightly as needed (muscle spasms)  Dispense: 30 tablet; Refill: 0    Patient to return to clinic if symptoms worsen or fail to improve.

## 2022-09-07 LAB — PSA, TOTAL: 2.41 NG/ML (ref 0–4)

## 2022-12-13 ENCOUNTER — TELEPHONE (OUTPATIENT)
Dept: FAMILY MEDICINE CLINIC | Age: 72
End: 2022-12-13

## 2022-12-13 ENCOUNTER — OFFICE VISIT (OUTPATIENT)
Dept: FAMILY MEDICINE CLINIC | Age: 72
End: 2022-12-13
Payer: MEDICARE

## 2022-12-13 VITALS
HEIGHT: 69 IN | WEIGHT: 251 LBS | DIASTOLIC BLOOD PRESSURE: 92 MMHG | BODY MASS INDEX: 37.18 KG/M2 | RESPIRATION RATE: 98 BRPM | HEART RATE: 76 BPM | SYSTOLIC BLOOD PRESSURE: 148 MMHG | TEMPERATURE: 98.2 F

## 2022-12-13 DIAGNOSIS — Z00.00 MEDICARE ANNUAL WELLNESS VISIT, SUBSEQUENT: Primary | ICD-10-CM

## 2022-12-13 DIAGNOSIS — R06.02 SHORTNESS OF BREATH: ICD-10-CM

## 2022-12-13 DIAGNOSIS — E66.01 SEVERE OBESITY (BMI 35.0-39.9) WITH COMORBIDITY (HCC): ICD-10-CM

## 2022-12-13 DIAGNOSIS — E78.00 PURE HYPERCHOLESTEROLEMIA: ICD-10-CM

## 2022-12-13 DIAGNOSIS — E53.8 VITAMIN B12 DEFICIENCY: ICD-10-CM

## 2022-12-13 DIAGNOSIS — E55.9 VITAMIN D DEFICIENCY: ICD-10-CM

## 2022-12-13 PROCEDURE — 1123F ACP DISCUSS/DSCN MKR DOCD: CPT | Performed by: FAMILY MEDICINE

## 2022-12-13 PROCEDURE — 99214 OFFICE O/P EST MOD 30 MIN: CPT | Performed by: FAMILY MEDICINE

## 2022-12-13 PROCEDURE — 93000 ELECTROCARDIOGRAM COMPLETE: CPT | Performed by: FAMILY MEDICINE

## 2022-12-13 PROCEDURE — G0439 PPPS, SUBSEQ VISIT: HCPCS | Performed by: FAMILY MEDICINE

## 2022-12-13 RX ORDER — SIMVASTATIN 40 MG
TABLET ORAL
Qty: 90 TABLET | Refills: 3 | Status: SHIPPED | OUTPATIENT
Start: 2022-12-13

## 2022-12-13 ASSESSMENT — PATIENT HEALTH QUESTIONNAIRE - PHQ9
SUM OF ALL RESPONSES TO PHQ QUESTIONS 1-9: 0
2. FEELING DOWN, DEPRESSED OR HOPELESS: 0
SUM OF ALL RESPONSES TO PHQ QUESTIONS 1-9: 0
1. LITTLE INTEREST OR PLEASURE IN DOING THINGS: 0
SUM OF ALL RESPONSES TO PHQ9 QUESTIONS 1 & 2: 0

## 2022-12-13 ASSESSMENT — LIFESTYLE VARIABLES
HOW MANY STANDARD DRINKS CONTAINING ALCOHOL DO YOU HAVE ON A TYPICAL DAY: PATIENT DOES NOT DRINK
HOW OFTEN DO YOU HAVE A DRINK CONTAINING ALCOHOL: NEVER

## 2022-12-13 ASSESSMENT — ENCOUNTER SYMPTOMS: SHORTNESS OF BREATH: 1

## 2022-12-13 NOTE — PATIENT INSTRUCTIONS
Learning About Being Active as an Older Adult  Why is being active important as you get older? Being active is one of the best things you can do for your health. And it's never too late to start. Being active--or getting active, if you aren't already--has definite benefits. It can:  Give you more energy,  Keep your mind sharp. Improve balance to reduce your risk of falls. Help you manage chronic illness with fewer medicines. No matter how old you are, how fit you are, or what health problems you have, there is a form of activity that will work for you. And the more physical activity you can do, the better your overall health will be. What kinds of activity can help you stay healthy? Being more active will make your daily activities easier. Physical activity includes planned exercise and things you do in daily life. There are four types of activity:  Aerobic. Doing aerobic activity makes your heart and lungs strong. Includes walking, dancing, and gardening. Aim for at least 2½ hours spread throughout the week. It improves your energy and can help you sleep better. Muscle-strengthening. This type of activity can help maintain muscle and strengthen bones. Includes climbing stairs, using resistance bands, and lifting or carrying heavy loads. Aim for at least twice a week. It can help protect the knees and other joints. Stretching. Stretching gives you better range of motion in joints and muscles. Includes upper arm stretches, calf stretches, and gentle yoga. Aim for at least twice a week, preferably after your muscles are warmed up from other activities. It can help you function better in daily life. Balancing. This helps you stay coordinated and have good posture. Includes heel-to-toe walking, maye chi, and certain types of yoga. Aim for at least 3 days a week. It can reduce your risk of falling.   Even if you have a hard time meeting the recommendations, it's better to be more active stop and talk to your doctor. Where can you learn more? Go to http://www.mortensen.com/ and enter P600 to learn more about \"Learning About Being Active as an Older Adult. \"  Current as of: October 10, 2022               Content Version: 13.5  © 0110-7941 Healthwise, Incorporated. Care instructions adapted under license by [facility] ([facility]). If you have questions about a medical condition or this instruction, always ask your healthcare professional. [place] [address] any warranty or liability for your use of this information. Advance Directives: Care Instructions  Overview  An advance directive is a legal way to state your wishes at the end of your life. It tells your family and your doctor what to do if you can't say what you want. There are two main types of advance directives. You can change them any time your wishes change. Living will. This form tells your family and your doctor your wishes about life support and other treatment. The form is also called a declaration. Medical power of . This form lets you name a person to make treatment decisions for you when you can't speak for yourself. This person is called a health care agent (health care proxy, health care surrogate). The form is also called a durable power of  for health care. If you do not have an advance directive, decisions about your medical care may be made by a family member, or by a doctor or a  who doesn't know you. It may help to think of an advance directive as a gift to the people who care for you. If you have one, they won't have to make tough decisions by themselves. For more information, including forms for your state, see the [address] website (www.caringinfo.org/planning/advance-directives/). Follow-up care is a key part of your treatment and safety. Be sure to make and go to all appointments, and call your doctor if you are having problems.  It's also a good idea to know your test results and keep a list of the medicines you take. What should you include in an advance directive? Many states have a unique advance directive form. (It may ask you to address specific issues.) Or you might use a universal form that's approved by many states. If your form doesn't tell you what to address, it may be hard to know what to include in your advance directive. Use the questions below to help you get started. Who do you want to make decisions about your medical care if you are not able to? What life-support measures do you want if you have a serious illness that gets worse over time or can't be cured? What are you most afraid of that might happen? (Maybe you're afraid of having pain, losing your independence, or being kept alive by machines.)  Where would you prefer to die? (Your home? A hospital? A nursing home?)  Do you want to donate your organs when you die? Do you want certain [employer] practices performed before you die? When should you call for help? Be sure to contact your doctor if you have any questions. Where can you learn more? Go to http://MetaMaterials.mortensen.com/ and enter R264 to learn more about \"Advance Directives: Care Instructions. \"  Current as of: June 16, 2022               Content Version: 13.5  © 3370-8265 Healthwise, Incorporated. Care instructions adapted under license by [facility] ([facility]). If you have questions about a medical condition or this instruction, always ask your healthcare professional. [place] [address] any warranty or liability for your use of this information. Personalized Preventive Plan for [facility] - 12/13/2022  Medicare offers a range of preventive health benefits. Some of the tests and screenings are paid in full while other may be subject to a deductible, co-insurance, and/or copay.     Some of these benefits include a comprehensive review of your medical history including lifestyle, illnesses that may run in your family, and various assessments and screenings as appropriate. After reviewing your medical record and screening and assessments performed today your provider may have ordered immunizations, labs, imaging, and/or referrals for you. A list of these orders (if applicable) as well as your Preventive Care list are included within your After Visit Summary for your review. Other Preventive Recommendations:    A preventive eye exam performed by an eye specialist is recommended every 1-2 years to screen for glaucoma; cataracts, macular degeneration, and other eye disorders. A preventive dental visit is recommended every 6 months. Try to get at least 150 minutes of exercise per week or 10,000 steps per day on a pedometer . Order or download the FREE \"Exercise & Physical Activity: Your Everyday Guide\" from [employer]. Call 9-392.172.4439 or search [employer] online. You need 3687-0045 mg of calcium and 8924-4231 IU of vitamin D per day. It is possible to meet your calcium requirement with diet alone, but a vitamin D supplement is usually necessary to meet this goal.  When exposed to the sun, use a sunscreen that protects against both UVA and UVB radiation with an SPF of 30 or greater. Reapply every 2 to 3 hours or after sweating, drying off with a towel, or swimming. Always wear a seat belt when traveling in a car. Always wear a helmet when riding a bicycle or motorcycle.

## 2022-12-13 NOTE — TELEPHONE ENCOUNTER
Patient came in this morning, having a visit with Dr. Andre Newman, when bring patient back to the exam room. He did not speak a word. I politely said Ruma my name is Rashida , how are you today? Patient did not speak. Patient had the AWV packet in his hands, I told him if he was finished I would take it - he stated that he was not done. I told him I can go ahead and take it and we'll do the other side verbally. Patient sat quietly while  I started to enter the marked question. In the meantime I asked him if, He could recite 3 words that I would give him back to me in a few minutes. Patient abruptly said \" No Im not answering those words! \" \"Move on!\" \" I just drove from [place] and back to [place]- I can remember - I don't need a test\" \" 2 years ago you guys gave me 3 words and I didn't say them\" \"I don't care about it\" \"Move on I said\"    I asked him please do not speak to me like that I am not a child, but I am an adult just like you. Patient said to \" stop talking about it\" \"Move on\" with the motion of a hand signal to wave away. I politely told him that I will not be rooming him and can speak to our practice manager, Jamin Garcia. As I was walking out - he stated, If someone wants to talk to him, that's fine.  He also said  \"I can not wait until the review comes\"

## 2022-12-13 NOTE — PROGRESS NOTES
Shola Varner is a 67 y.o. male    Chief Complaint   Patient presents with    Medicare AWV    Shortness of Breath     Under rib area on right side and under armpit on the left arm    Hyperlipidemia       HPI:    Hyperlipidemia  This is a chronic problem. The current episode started more than 1 year ago. The problem is controlled. Recent lipid tests were reviewed and are normal. Associated symptoms include shortness of breath. Pertinent negatives include no chest pain or myalgias. Current antihyperlipidemic treatment includes statins. The current treatment provides significant improvement of lipids. There are no compliance problems. Risk factors for coronary artery disease include male sex and obesity. Shortness of Breath  This is a new problem. The current episode started more than 1 month ago. The problem occurs daily. The problem has been unchanged. Pertinent negatives include no chest pain. The symptoms are aggravated by any activity. He has tried nothing for the symptoms. ROS:    Review of Systems   Respiratory:  Positive for shortness of breath. Cardiovascular:  Negative for chest pain. Musculoskeletal:  Negative for myalgias. BP (!) 148/92 (Site: Left Upper Arm, Position: Sitting, Cuff Size: Medium Adult)   Pulse 76   Temp 98.2 °F (36.8 °C) (Oral)   Resp (!) 98   Ht 5' 9\" (1.753 m)   Wt 251 lb (113.9 kg)   BMI 37.07 kg/m²     Physical Exam:    Physical Exam  Constitutional:       General: He is not in acute distress. Appearance: Normal appearance. He is well-developed. He is obese. He is not ill-appearing, toxic-appearing or diaphoretic. HENT:      Head: Normocephalic. Cardiovascular:      Rate and Rhythm: Normal rate and regular rhythm. Pulses: Normal pulses. Heart sounds: No murmur heard. Pulmonary:      Effort: Pulmonary effort is normal. No respiratory distress. Breath sounds: Normal breath sounds. No wheezing.    Musculoskeletal:      Cervical back: Normal range Annual Wellness Visit in 1 year. Medicare Annual Wellness Visit    Ryland Mohs is here for Medicare AWV, Shortness of Breath (Under rib area on right side and under armpit on the left arm), and Hyperlipidemia    Assessment & Plan   Medicare annual wellness visit, subsequent  Shortness of breath  -     EKG 12 Lead - Clinic Performed  Pure hypercholesterolemia  -     CBC with Auto Differential; Future  -     Comprehensive Metabolic Panel; Future  -     Lipid Panel; Future  -     TSH with Reflex; Future  -     simvastatin (ZOCOR) 40 MG tablet; TAKE 1 TABLET BY MOUTH EVERY EVENING, Disp-90 tablet, R-3Normal  Severe obesity (BMI 35.0-39. 9) with comorbidity (HCC)  Vitamin D deficiency  -     Vitamin D 25 Hydroxy; Future  Vitamin B12 deficiency  -     Vitamin B12 & Folate; Future    Recommendations for Preventive Services Due: see orders and patient instructions/AVS.  Recommended screening schedule for the next 5-10 years is provided to the patient in written form: see Patient Instructions/AVS.     Return in 1 year (on 12/13/2023) for Medicare Annual Wellness Visit in 1 year. Subjective       Patient's complete Health Risk Assessment and screening values have been reviewed and are found in Flowsheets. The following problems were reviewed today and where indicated follow up appointments were made and/or referrals ordered.     Positive Risk Factor Screenings with Interventions:                 Weight and Activity:  Physical Activity: Inactive    Days of Exercise per Week: 0 days    Minutes of Exercise per Session: 0 min     On average, how many days per week do you engage in moderate to strenuous exercise (like a brisk walk)?: 0 days  Have you lost any weight without trying in the past 3 months?: No  Body mass index: (!) 37.06    Inactivity Interventions:  See AVS for additional education material  See A/P for plan and any pertinent orders  Obesity Interventions:  See AVS for additional education material  See A/P for plan and any pertinent orders                             Objective   Vitals:    12/13/22 0815   BP: (!) 148/92   Site: Left Upper Arm   Position: Sitting   Cuff Size: Medium Adult   Pulse: 76   Resp: (!) 98   Temp: 98.2 °F (36.8 °C)   TempSrc: Oral   Weight: 251 lb (113.9 kg)   Height: 5' 9\" (1.753 m)      Body mass index is 37.07 kg/m². No Known Allergies  Prior to Visit Medications    Medication Sig Taking?  Authorizing Provider   simvastatin (ZOCOR) 40 MG tablet TAKE 1 TABLET BY MOUTH EVERY EVENING Yes Sandra Ludwig DO   tiZANidine (ZANAFLEX) 2 MG tablet Take 1 tablet by mouth nightly as needed (muscle spasms)  Patient not taking: Reported on 12/13/2022  Sandra Jeronimo DO   triamcinolone (NASACORT) 55 MCG/ACT nasal inhaler 2 sprays by Each Nostril route daily  Patient not taking: No sig reported  CYDNEY Price - CNP       CareTeam (Including outside providers/suppliers regularly involved in providing care):   Patient Care Team:  Holly Partida DO as PCP - General (Family Medicine)  Holly Partida DO as PCP - [facility] Empaneled Provider  Zach Barrera MD as Consulting Physician (Ophthalmology)     Reviewed and updated this visit:  Allergies  Meds

## 2023-09-14 LAB — PSA, TOTAL: 2.53 NG/ML (ref 0–4)

## 2023-12-14 ENCOUNTER — OFFICE VISIT (OUTPATIENT)
Dept: FAMILY MEDICINE CLINIC | Age: 73
End: 2023-12-14
Payer: MEDICARE

## 2023-12-14 VITALS
SYSTOLIC BLOOD PRESSURE: 134 MMHG | WEIGHT: 254 LBS | OXYGEN SATURATION: 98 % | HEART RATE: 94 BPM | DIASTOLIC BLOOD PRESSURE: 78 MMHG | BODY MASS INDEX: 37.51 KG/M2

## 2023-12-14 DIAGNOSIS — Z11.59 NEED FOR HEPATITIS B SCREENING TEST: ICD-10-CM

## 2023-12-14 DIAGNOSIS — E55.9 VITAMIN D DEFICIENCY: ICD-10-CM

## 2023-12-14 DIAGNOSIS — E53.8 VITAMIN B12 DEFICIENCY: ICD-10-CM

## 2023-12-14 DIAGNOSIS — E78.00 PURE HYPERCHOLESTEROLEMIA: ICD-10-CM

## 2023-12-14 DIAGNOSIS — L03.116 CELLULITIS OF LEFT LOWER EXTREMITY: ICD-10-CM

## 2023-12-14 DIAGNOSIS — R09.81 NASAL CONGESTION: ICD-10-CM

## 2023-12-14 DIAGNOSIS — Z00.00 MEDICARE ANNUAL WELLNESS VISIT, SUBSEQUENT: Primary | ICD-10-CM

## 2023-12-14 LAB
25(OH)D3 SERPL-MCNC: 33.2 NG/ML
ALBUMIN SERPL-MCNC: 4.3 G/DL (ref 3.4–5)
ALBUMIN/GLOB SERPL: 1.3 {RATIO} (ref 1.1–2.2)
ALP SERPL-CCNC: 69 U/L (ref 40–129)
ALT SERPL-CCNC: 17 U/L (ref 10–40)
ANION GAP SERPL CALCULATED.3IONS-SCNC: 8 MMOL/L (ref 3–16)
AST SERPL-CCNC: 18 U/L (ref 15–37)
BASOPHILS # BLD: 0 K/UL (ref 0–0.2)
BASOPHILS NFR BLD: 0.8 %
BILIRUB SERPL-MCNC: 0.6 MG/DL (ref 0–1)
BUN SERPL-MCNC: 20 MG/DL (ref 7–20)
CALCIUM SERPL-MCNC: 8.8 MG/DL (ref 8.3–10.6)
CHLORIDE SERPL-SCNC: 101 MMOL/L (ref 99–110)
CHOLEST SERPL-MCNC: 161 MG/DL (ref 0–199)
CO2 SERPL-SCNC: 29 MMOL/L (ref 21–32)
CREAT SERPL-MCNC: 1.1 MG/DL (ref 0.8–1.3)
DEPRECATED RDW RBC AUTO: 14.1 % (ref 12.4–15.4)
EOSINOPHIL # BLD: 0.4 K/UL (ref 0–0.6)
EOSINOPHIL NFR BLD: 6.1 %
FOLATE SERPL-MCNC: 6.43 NG/ML (ref 4.78–24.2)
GFR SERPLBLD CREATININE-BSD FMLA CKD-EPI: >60 ML/MIN/{1.73_M2}
GLUCOSE SERPL-MCNC: 93 MG/DL (ref 70–99)
HBV SURFACE AB SERPL IA-ACNC: <3.5 MIU/ML
HCT VFR BLD AUTO: 41.8 % (ref 40.5–52.5)
HDLC SERPL-MCNC: 48 MG/DL (ref 40–60)
HGB BLD-MCNC: 14 G/DL (ref 13.5–17.5)
LDLC SERPL CALC-MCNC: 92 MG/DL
LYMPHOCYTES # BLD: 0.5 K/UL (ref 1–5.1)
LYMPHOCYTES NFR BLD: 7.7 %
MCH RBC QN AUTO: 29.7 PG (ref 26–34)
MCHC RBC AUTO-ENTMCNC: 33.5 G/DL (ref 31–36)
MCV RBC AUTO: 88.7 FL (ref 80–100)
MONOCYTES # BLD: 0.4 K/UL (ref 0–1.3)
MONOCYTES NFR BLD: 6 %
NEUTROPHILS # BLD: 5 K/UL (ref 1.7–7.7)
NEUTROPHILS NFR BLD: 79.4 %
PLATELET # BLD AUTO: 194 K/UL (ref 135–450)
PMV BLD AUTO: 8.9 FL (ref 5–10.5)
POTASSIUM SERPL-SCNC: 4.3 MMOL/L (ref 3.5–5.1)
PROT SERPL-MCNC: 7.5 G/DL (ref 6.4–8.2)
RBC # BLD AUTO: 4.72 M/UL (ref 4.2–5.9)
SODIUM SERPL-SCNC: 138 MMOL/L (ref 136–145)
TRIGL SERPL-MCNC: 107 MG/DL (ref 0–150)
TSH SERPL DL<=0.005 MIU/L-ACNC: 2.44 UIU/ML (ref 0.27–4.2)
VIT B12 SERPL-MCNC: 339 PG/ML (ref 211–911)
VLDLC SERPL CALC-MCNC: 21 MG/DL
WBC # BLD AUTO: 6.3 K/UL (ref 4–11)

## 2023-12-14 PROCEDURE — 1123F ACP DISCUSS/DSCN MKR DOCD: CPT | Performed by: FAMILY MEDICINE

## 2023-12-14 PROCEDURE — 99214 OFFICE O/P EST MOD 30 MIN: CPT | Performed by: FAMILY MEDICINE

## 2023-12-14 PROCEDURE — G0439 PPPS, SUBSEQ VISIT: HCPCS | Performed by: FAMILY MEDICINE

## 2023-12-14 RX ORDER — SIMVASTATIN 40 MG
TABLET ORAL
Qty: 90 TABLET | Refills: 3 | Status: SHIPPED | OUTPATIENT
Start: 2023-12-14

## 2023-12-14 RX ORDER — SULFAMETHOXAZOLE AND TRIMETHOPRIM 800; 160 MG/1; MG/1
1 TABLET ORAL 2 TIMES DAILY
Qty: 14 TABLET | Refills: 0 | Status: SHIPPED | OUTPATIENT
Start: 2023-12-14 | End: 2023-12-21

## 2023-12-14 ASSESSMENT — PATIENT HEALTH QUESTIONNAIRE - PHQ9
6. FEELING BAD ABOUT YOURSELF - OR THAT YOU ARE A FAILURE OR HAVE LET YOURSELF OR YOUR FAMILY DOWN: 0
3. TROUBLE FALLING OR STAYING ASLEEP: 1
4. FEELING TIRED OR HAVING LITTLE ENERGY: 0
5. POOR APPETITE OR OVEREATING: 1
SUM OF ALL RESPONSES TO PHQ QUESTIONS 1-9: 2
8. MOVING OR SPEAKING SO SLOWLY THAT OTHER PEOPLE COULD HAVE NOTICED. OR THE OPPOSITE, BEING SO FIGETY OR RESTLESS THAT YOU HAVE BEEN MOVING AROUND A LOT MORE THAN USUAL: 0
7. TROUBLE CONCENTRATING ON THINGS, SUCH AS READING THE NEWSPAPER OR WATCHING TELEVISION: 0
SUM OF ALL RESPONSES TO PHQ9 QUESTIONS 1 & 2: 0
9. THOUGHTS THAT YOU WOULD BE BETTER OFF DEAD, OR OF HURTING YOURSELF: 0
1. LITTLE INTEREST OR PLEASURE IN DOING THINGS: 0
2. FEELING DOWN, DEPRESSED OR HOPELESS: 0
10. IF YOU CHECKED OFF ANY PROBLEMS, HOW DIFFICULT HAVE THESE PROBLEMS MADE IT FOR YOU TO DO YOUR WORK, TAKE CARE OF THINGS AT HOME, OR GET ALONG WITH OTHER PEOPLE: 0
SUM OF ALL RESPONSES TO PHQ QUESTIONS 1-9: 2

## 2023-12-14 NOTE — PATIENT INSTRUCTIONS
Summary for your review. Other Preventive Recommendations:    A preventive eye exam performed by an eye specialist is recommended every 1-2 years to screen for glaucoma; cataracts, macular degeneration, and other eye disorders. A preventive dental visit is recommended every 6 months. Try to get at least 150 minutes of exercise per week or 10,000 steps per day on a pedometer . Order or download the FREE \"Exercise & Physical Activity: Your Everyday Guide\" from [employer]. Call 3-810.101.9094 or search [employer] online. You need 1875-3782 mg of calcium and 3651-8745 IU of vitamin D per day. It is possible to meet your calcium requirement with diet alone, but a vitamin D supplement is usually necessary to meet this goal.  When exposed to the sun, use a sunscreen that protects against both UVA and UVB radiation with an SPF of 30 or greater. Reapply every 2 to 3 hours or after sweating, drying off with a towel, or swimming. Always wear a seat belt when traveling in a car. Always wear a helmet when riding a bicycle or motorcycle.

## 2024-01-02 ENCOUNTER — OFFICE VISIT (OUTPATIENT)
Dept: FAMILY MEDICINE CLINIC | Age: 74
End: 2024-01-02

## 2024-01-02 ENCOUNTER — HOSPITAL ENCOUNTER (OUTPATIENT)
Dept: GENERAL RADIOLOGY | Age: 74
Discharge: HOME | End: 2024-01-02
Payer: MEDICARE

## 2024-01-02 VITALS
DIASTOLIC BLOOD PRESSURE: 76 MMHG | SYSTOLIC BLOOD PRESSURE: 138 MMHG | OXYGEN SATURATION: 98 % | WEIGHT: 254 LBS | BODY MASS INDEX: 37.51 KG/M2 | HEART RATE: 95 BPM

## 2024-01-02 DIAGNOSIS — L03.116 CELLULITIS OF LEFT LOWER EXTREMITY: Primary | ICD-10-CM

## 2024-01-02 DIAGNOSIS — L03.116 CELLULITIS OF LEFT LOWER EXTREMITY: ICD-10-CM

## 2024-01-02 PROCEDURE — 73590 X-RAY EXAM OF LOWER LEG: CPT

## 2024-01-02 PROCEDURE — 99213 OFFICE O/P EST LOW 20 MIN: CPT | Performed by: FAMILY MEDICINE

## 2024-01-02 PROCEDURE — 1123F ACP DISCUSS/DSCN MKR DOCD: CPT | Performed by: FAMILY MEDICINE

## 2024-01-02 RX ORDER — DOXYCYCLINE HYCLATE 100 MG
100 TABLET ORAL 2 TIMES DAILY
Qty: 20 TABLET | Refills: 0 | Status: SHIPPED | OUTPATIENT
Start: 2024-01-02 | End: 2024-01-12

## 2024-01-02 RX ORDER — FUROSEMIDE 20 MG/1
20 TABLET ORAL DAILY PRN
Qty: 30 TABLET | Refills: 0 | Status: SHIPPED | OUTPATIENT
Start: 2024-01-02

## 2024-01-02 ASSESSMENT — PATIENT HEALTH QUESTIONNAIRE - PHQ9
SUM OF ALL RESPONSES TO PHQ QUESTIONS 1-9: 0
6. FEELING BAD ABOUT YOURSELF - OR THAT YOU ARE A FAILURE OR HAVE LET YOURSELF OR YOUR FAMILY DOWN: 0
SUM OF ALL RESPONSES TO PHQ QUESTIONS 1-9: 0
SUM OF ALL RESPONSES TO PHQ9 QUESTIONS 1 & 2: 0
1. LITTLE INTEREST OR PLEASURE IN DOING THINGS: 0
4. FEELING TIRED OR HAVING LITTLE ENERGY: 0
2. FEELING DOWN, DEPRESSED OR HOPELESS: 0
7. TROUBLE CONCENTRATING ON THINGS, SUCH AS READING THE NEWSPAPER OR WATCHING TELEVISION: 0
SUM OF ALL RESPONSES TO PHQ QUESTIONS 1-9: 0
5. POOR APPETITE OR OVEREATING: 0
3. TROUBLE FALLING OR STAYING ASLEEP: 0
SUM OF ALL RESPONSES TO PHQ QUESTIONS 1-9: 0
8. MOVING OR SPEAKING SO SLOWLY THAT OTHER PEOPLE COULD HAVE NOTICED. OR THE OPPOSITE, BEING SO FIGETY OR RESTLESS THAT YOU HAVE BEEN MOVING AROUND A LOT MORE THAN USUAL: 0
9. THOUGHTS THAT YOU WOULD BE BETTER OFF DEAD, OR OF HURTING YOURSELF: 0
10. IF YOU CHECKED OFF ANY PROBLEMS, HOW DIFFICULT HAVE THESE PROBLEMS MADE IT FOR YOU TO DO YOUR WORK, TAKE CARE OF THINGS AT HOME, OR GET ALONG WITH OTHER PEOPLE: 0

## 2024-01-02 ASSESSMENT — ANXIETY QUESTIONNAIRES
4. TROUBLE RELAXING: 0
GAD7 TOTAL SCORE: 0
5. BEING SO RESTLESS THAT IT IS HARD TO SIT STILL: 0
3. WORRYING TOO MUCH ABOUT DIFFERENT THINGS: 0
6. BECOMING EASILY ANNOYED OR IRRITABLE: 0
7. FEELING AFRAID AS IF SOMETHING AWFUL MIGHT HAPPEN: 0
1. FEELING NERVOUS, ANXIOUS, OR ON EDGE: 0
2. NOT BEING ABLE TO STOP OR CONTROL WORRYING: 0
IF YOU CHECKED OFF ANY PROBLEMS ON THIS QUESTIONNAIRE, HOW DIFFICULT HAVE THESE PROBLEMS MADE IT FOR YOU TO DO YOUR WORK, TAKE CARE OF THINGS AT HOME, OR GET ALONG WITH OTHER PEOPLE: NOT DIFFICULT AT ALL

## 2024-01-02 NOTE — PROGRESS NOTES
Jorge Easton is a 73 y.o. male    Chief Complaint   Patient presents with    Leg Swelling     Left leg. 4 weeks         HPI:  Leg Pain   The incident occurred more than 4 weeks ago. The incident occurred at home. The injury mechanism was a direct blow (Steel bar). The pain is present in the left ankle. The pain has been Worsening since onset. Pertinent negatives include no numbness or tingling. He reports no foreign bodies present. Nothing aggravates the symptoms. He has tried elevation for the symptoms. The treatment provided mild relief.       ROS:    Review of Systems   Skin:         Warmth to the lateral left lower leg       /76   Pulse 95   Wt 115.2 kg (254 lb)   SpO2 98%   BMI 37.51 kg/m²     Physical Exam:    Physical Exam  Constitutional:       General: He is not in acute distress.     Appearance: Normal appearance. He is obese. He is not ill-appearing or toxic-appearing.   HENT:      Head: Normocephalic.   Musculoskeletal:         General: Swelling (left lateral lower leg, mild erythema) present. No tenderness.   Neurological:      Mental Status: He is alert.   Psychiatric:         Mood and Affect: Mood normal.         Behavior: Behavior normal.         Thought Content: Thought content normal.         Current Outpatient Medications   Medication Sig Dispense Refill    doxycycline hyclate (VIBRA-TABS) 100 MG tablet Take 1 tablet by mouth 2 times daily for 10 days 20 tablet 0    furosemide (LASIX) 20 MG tablet Take 1 tablet by mouth daily as needed (leg swelling) 30 tablet 0    ondansetron (ZOFRAN-ODT) 4 MG disintegrating tablet Take 1 tablet by mouth 3 times daily as needed for Nausea or Vomiting 21 tablet 0    simvastatin (ZOCOR) 40 MG tablet TAKE 1 TABLET BY MOUTH EVERY EVENING 90 tablet 3    triamcinolone (NASACORT) 55 MCG/ACT nasal inhaler 2 sprays by Each Nostril route daily (Patient not taking: No sig reported) 1 Inhaler 0     No current facility-administered medications for this visit.

## 2024-02-01 ENCOUNTER — TELEPHONE (OUTPATIENT)
Dept: FAMILY MEDICINE CLINIC | Age: 74
End: 2024-02-01

## 2024-02-01 NOTE — TELEPHONE ENCOUNTER
Patient called into nurse triage to schedule an office visit for cellulitis left leg, patient seen in office 1/02/24.  Patient requesting follow up.  Patient scheduled for 2/15/24.

## 2024-02-15 ENCOUNTER — HOSPITAL ENCOUNTER (OUTPATIENT)
Dept: GENERAL RADIOLOGY | Age: 74
Discharge: HOME | End: 2024-02-15
Payer: MEDICARE

## 2024-02-15 ENCOUNTER — OFFICE VISIT (OUTPATIENT)
Dept: FAMILY MEDICINE CLINIC | Age: 74
End: 2024-02-15
Payer: MEDICARE

## 2024-02-15 VITALS
BODY MASS INDEX: 36.33 KG/M2 | WEIGHT: 246 LBS | SYSTOLIC BLOOD PRESSURE: 134 MMHG | OXYGEN SATURATION: 97 % | HEART RATE: 80 BPM | DIASTOLIC BLOOD PRESSURE: 78 MMHG

## 2024-02-15 DIAGNOSIS — L03.116 CELLULITIS OF LEFT LOWER EXTREMITY: Primary | ICD-10-CM

## 2024-02-15 DIAGNOSIS — L03.116 CELLULITIS OF LEFT LOWER EXTREMITY: ICD-10-CM

## 2024-02-15 PROCEDURE — 99213 OFFICE O/P EST LOW 20 MIN: CPT | Performed by: FAMILY MEDICINE

## 2024-02-15 PROCEDURE — 1123F ACP DISCUSS/DSCN MKR DOCD: CPT | Performed by: FAMILY MEDICINE

## 2024-02-15 PROCEDURE — 73590 X-RAY EXAM OF LOWER LEG: CPT

## 2024-02-15 NOTE — PROGRESS NOTES
Jorge Easton is a 73 y.o. male    Chief Complaint   Patient presents with    Cellulitis     Left leg progression.   Patient feels a hard area in his leg       HPI:  Leg Pain   The incident occurred more than 4 weeks ago. The incident occurred at home. The injury mechanism was a direct blow (Steel bar). The pain is present in the left ankle. The pain has been Worsening since onset. Pertinent negatives include no numbness or tingling. He reports no foreign bodies present. Nothing aggravates the symptoms. He has tried elevation for the symptoms. The treatment provided mild relief.       ROS:    Review of Systems   Skin:         Warmth to the lateral left lower leg       /78   Pulse 80   Wt 111.6 kg (246 lb)   SpO2 97%   BMI 36.33 kg/m²     Physical Exam:    Physical Exam  Constitutional:       General: He is not in acute distress.     Appearance: Normal appearance. He is obese. He is not ill-appearing or toxic-appearing.   HENT:      Head: Normocephalic.   Musculoskeletal:         General: Swelling (left lateral lower leg, mild erythema) present. No tenderness.   Neurological:      Mental Status: He is alert.   Psychiatric:         Mood and Affect: Mood normal.         Behavior: Behavior normal.         Thought Content: Thought content normal.         Current Outpatient Medications   Medication Sig Dispense Refill    furosemide (LASIX) 20 MG tablet Take 1 tablet by mouth daily as needed (leg swelling) 30 tablet 0    ondansetron (ZOFRAN-ODT) 4 MG disintegrating tablet Take 1 tablet by mouth 3 times daily as needed for Nausea or Vomiting 21 tablet 0    simvastatin (ZOCOR) 40 MG tablet TAKE 1 TABLET BY MOUTH EVERY EVENING 90 tablet 3    triamcinolone (NASACORT) 55 MCG/ACT nasal inhaler 2 sprays by Each Nostril route daily 1 Inhaler 0     No current facility-administered medications for this visit.       Assessment:    1. Cellulitis of left lower extremity        Plan:    1. Cellulitis of left lower

## 2024-05-14 ENCOUNTER — OFFICE VISIT (OUTPATIENT)
Dept: FAMILY MEDICINE CLINIC | Age: 74
End: 2024-05-14
Payer: MEDICARE

## 2024-05-14 VITALS
HEART RATE: 73 BPM | WEIGHT: 240 LBS | BODY MASS INDEX: 35.44 KG/M2 | OXYGEN SATURATION: 97 % | SYSTOLIC BLOOD PRESSURE: 136 MMHG | DIASTOLIC BLOOD PRESSURE: 62 MMHG

## 2024-05-14 DIAGNOSIS — R05.1 ACUTE COUGH: Primary | ICD-10-CM

## 2024-05-14 PROCEDURE — 99213 OFFICE O/P EST LOW 20 MIN: CPT | Performed by: FAMILY MEDICINE

## 2024-05-14 PROCEDURE — 1123F ACP DISCUSS/DSCN MKR DOCD: CPT | Performed by: FAMILY MEDICINE

## 2024-05-14 SDOH — ECONOMIC STABILITY: FOOD INSECURITY: WITHIN THE PAST 12 MONTHS, THE FOOD YOU BOUGHT JUST DIDN'T LAST AND YOU DIDN'T HAVE MONEY TO GET MORE.: NEVER TRUE

## 2024-05-14 SDOH — ECONOMIC STABILITY: FOOD INSECURITY: WITHIN THE PAST 12 MONTHS, YOU WORRIED THAT YOUR FOOD WOULD RUN OUT BEFORE YOU GOT MONEY TO BUY MORE.: NEVER TRUE

## 2024-05-14 SDOH — ECONOMIC STABILITY: INCOME INSECURITY: HOW HARD IS IT FOR YOU TO PAY FOR THE VERY BASICS LIKE FOOD, HOUSING, MEDICAL CARE, AND HEATING?: NOT HARD AT ALL

## 2024-05-14 ASSESSMENT — ENCOUNTER SYMPTOMS
SINUS COMPLAINT: 1
COUGH: 1
WHEEZING: 0
SORE THROAT: 0

## 2024-05-14 NOTE — PROGRESS NOTES
Jorge Easton is a 73 y.o. male    Chief Complaint   Patient presents with    Cough    Congestion     Yellow and green mucus      Pharyngitis    Sinus Problem     Headache (pressure)        HPI:    Cough  This is a new problem. The current episode started yesterday. The problem has been gradually worsening. The cough is Productive of sputum. Pertinent negatives include no fever, sore throat or wheezing. The symptoms are aggravated by lying down. He has tried nothing for the symptoms. There is no history of COPD.   Pharyngitis  Associated symptoms include coughing. Pertinent negatives include no fever or sore throat.   Sinus Problem  Associated symptoms include coughing. Pertinent negatives include no sore throat.         ROS:    Review of Systems   Constitutional:  Negative for fever.   HENT:  Negative for sore throat.    Respiratory:  Positive for cough. Negative for wheezing.        /62   Pulse 73   Wt 108.9 kg (240 lb)   SpO2 97%   BMI 35.44 kg/m²     Physical Exam:    Physical Exam  Constitutional:       General: He is not in acute distress.     Appearance: Normal appearance. He is well-developed. He is obese. He is not ill-appearing, toxic-appearing or diaphoretic.   HENT:      Head: Normocephalic.      Nose: Rhinorrhea present.   Cardiovascular:      Rate and Rhythm: Normal rate and regular rhythm.      Pulses: Normal pulses.      Heart sounds: No murmur heard.  Pulmonary:      Effort: Pulmonary effort is normal. No respiratory distress.      Breath sounds: Normal breath sounds. No wheezing.   Musculoskeletal:      Cervical back: Normal range of motion. No rigidity.   Lymphadenopathy:      Cervical: No cervical adenopathy.   Neurological:      Mental Status: He is alert.   Psychiatric:         Mood and Affect: Mood normal.         Behavior: Behavior normal.         Thought Content: Thought content normal.         Current Outpatient Medications   Medication Sig Dispense Refill    furosemide (LASIX) 20

## 2024-08-23 ENCOUNTER — OFFICE VISIT (OUTPATIENT)
Dept: FAMILY MEDICINE CLINIC | Age: 74
End: 2024-08-23
Payer: MEDICARE

## 2024-08-23 VITALS
HEIGHT: 69 IN | TEMPERATURE: 98.4 F | HEART RATE: 75 BPM | BODY MASS INDEX: 34.93 KG/M2 | WEIGHT: 235.8 LBS | DIASTOLIC BLOOD PRESSURE: 80 MMHG | SYSTOLIC BLOOD PRESSURE: 140 MMHG | OXYGEN SATURATION: 97 %

## 2024-08-23 DIAGNOSIS — J22 LOWER RESPIRATORY INFECTION: Primary | ICD-10-CM

## 2024-08-23 DIAGNOSIS — J06.9 UPPER RESPIRATORY TRACT INFECTION, UNSPECIFIED TYPE: ICD-10-CM

## 2024-08-23 PROCEDURE — 1123F ACP DISCUSS/DSCN MKR DOCD: CPT | Performed by: NURSE PRACTITIONER

## 2024-08-23 PROCEDURE — 99213 OFFICE O/P EST LOW 20 MIN: CPT | Performed by: NURSE PRACTITIONER

## 2024-08-23 RX ORDER — AZITHROMYCIN 250 MG/1
TABLET, FILM COATED ORAL
Qty: 6 TABLET | Refills: 0 | Status: SHIPPED | OUTPATIENT
Start: 2024-08-23 | End: 2024-09-02

## 2024-08-23 ASSESSMENT — ENCOUNTER SYMPTOMS
SORE THROAT: 1
SHORTNESS OF BREATH: 0
SINUS PRESSURE: 0
DIARRHEA: 0
COUGH: 1
WHEEZING: 0
NAUSEA: 0
VOMITING: 0
RHINORRHEA: 0
SINUS PAIN: 0

## 2024-08-23 NOTE — PROGRESS NOTES
Jorge Easton (:  1950) is a 73 y.o. male,Established patient, here for evaluation of the following chief complaint(s):  Pharyngitis (Ongoing for the last 24 hours), Cough (Ongoing x3 days, did try OTC Mucinex, pt states sometimes \"gray matter\" comes out/Denies fever, chills), and Other (Did take 2 Ibuprofen to help relieve sore throat)      Assessment & Plan   ASSESSMENT/PLAN:  1. Lower respiratory infection  -     azithromycin (ZITHROMAX) 250 MG tablet; 500mg on day 1 followed by 250mg on days 2 - 5, Disp-6 tablet, R-0Normal  2. Upper respiratory tract infection, unspecified type    -Offered Covid testing, patient declined   -Reviewed allergies, discussed medication risks, benefits, side effects. Take medication with food.   -Reviewed symptom management   -Reviewed alarm symptoms    Patient Instructions   Drink plenty of fluids   Get plenty of rest  Honey is a natural cough suppressant, may take a spoonful of honey by itself or mix it in warm tea   Humidifier, steamy showers  Flonase daily, normal saline nasal spray throughout the day   Tylenol or Ibuprofen per package directions as needed   Mucinex or Coricidin per package directions as needed   Notify office if symptoms worsen or do not improve   Go to nearest ER if develop shortness of breath, chest pain or significant worsening of symptoms  Finish full course of antibiotics unless told otherwise by provider       Return if symptoms worsen or fail to improve.         Subjective   SUBJECTIVE/OBJECTIVE:  Reports symptoms for five days   Has been taking Mucinex and Ibuprofen  for symptoms at home   \"I always get a sinus infection this time of year\"   Has been at the hospital with his wife for her cancer treatments but no other sick contacts   PMH negative for COPD or asthma     Pharyngitis  Associated symptoms include coughing (productive for gray sputum) and a sore throat (better with ibuprofen). Pertinent negatives include no chills, congestion, fatigue,

## 2024-12-17 ENCOUNTER — OFFICE VISIT (OUTPATIENT)
Dept: FAMILY MEDICINE CLINIC | Age: 74
End: 2024-12-17

## 2024-12-17 VITALS
DIASTOLIC BLOOD PRESSURE: 72 MMHG | BODY MASS INDEX: 34.8 KG/M2 | HEART RATE: 63 BPM | WEIGHT: 235 LBS | OXYGEN SATURATION: 97 % | TEMPERATURE: 96.9 F | HEIGHT: 69 IN | SYSTOLIC BLOOD PRESSURE: 130 MMHG

## 2024-12-17 DIAGNOSIS — Z00.00 MEDICARE ANNUAL WELLNESS VISIT, SUBSEQUENT: Primary | ICD-10-CM

## 2024-12-17 DIAGNOSIS — Z23 NEED FOR INFLUENZA VACCINATION: ICD-10-CM

## 2024-12-17 DIAGNOSIS — E78.00 PURE HYPERCHOLESTEROLEMIA: ICD-10-CM

## 2024-12-17 DIAGNOSIS — E55.9 VITAMIN D DEFICIENCY: ICD-10-CM

## 2024-12-17 LAB
25(OH)D3 SERPL-MCNC: 24.1 NG/ML
ALBUMIN SERPL-MCNC: 4.1 G/DL (ref 3.4–5)
ALBUMIN/GLOB SERPL: 1.3 {RATIO} (ref 1.1–2.2)
ALP SERPL-CCNC: 70 U/L (ref 40–129)
ALT SERPL-CCNC: 29 U/L (ref 10–40)
ANION GAP SERPL CALCULATED.3IONS-SCNC: 11 MMOL/L (ref 3–16)
AST SERPL-CCNC: 31 U/L (ref 15–37)
BASOPHILS # BLD: 0.1 K/UL (ref 0–0.2)
BASOPHILS NFR BLD: 0.8 %
BILIRUB SERPL-MCNC: 0.5 MG/DL (ref 0–1)
BUN SERPL-MCNC: 20 MG/DL (ref 7–20)
CALCIUM SERPL-MCNC: 9.4 MG/DL (ref 8.3–10.6)
CHLORIDE SERPL-SCNC: 103 MMOL/L (ref 99–110)
CHOLEST SERPL-MCNC: 171 MG/DL (ref 0–199)
CO2 SERPL-SCNC: 25 MMOL/L (ref 21–32)
CREAT SERPL-MCNC: 1.1 MG/DL (ref 0.8–1.3)
DEPRECATED RDW RBC AUTO: 14.4 % (ref 12.4–15.4)
EOSINOPHIL # BLD: 0.3 K/UL (ref 0–0.6)
EOSINOPHIL NFR BLD: 4.9 %
GFR SERPLBLD CREATININE-BSD FMLA CKD-EPI: 70 ML/MIN/{1.73_M2}
GLUCOSE SERPL-MCNC: 85 MG/DL (ref 70–99)
HCT VFR BLD AUTO: 41.9 % (ref 40.5–52.5)
HDLC SERPL-MCNC: 49 MG/DL (ref 40–60)
HGB BLD-MCNC: 13.8 G/DL (ref 13.5–17.5)
LDLC SERPL CALC-MCNC: 103 MG/DL
LYMPHOCYTES # BLD: 1.2 K/UL (ref 1–5.1)
LYMPHOCYTES NFR BLD: 19.1 %
MCH RBC QN AUTO: 30 PG (ref 26–34)
MCHC RBC AUTO-ENTMCNC: 33 G/DL (ref 31–36)
MCV RBC AUTO: 90.6 FL (ref 80–100)
MONOCYTES # BLD: 0.4 K/UL (ref 0–1.3)
MONOCYTES NFR BLD: 6.8 %
NEUTROPHILS # BLD: 4.4 K/UL (ref 1.7–7.7)
NEUTROPHILS NFR BLD: 68.4 %
PLATELET # BLD AUTO: 197 K/UL (ref 135–450)
PMV BLD AUTO: 9.2 FL (ref 5–10.5)
POTASSIUM SERPL-SCNC: 4.4 MMOL/L (ref 3.5–5.1)
PROT SERPL-MCNC: 7.3 G/DL (ref 6.4–8.2)
RBC # BLD AUTO: 4.62 M/UL (ref 4.2–5.9)
SODIUM SERPL-SCNC: 139 MMOL/L (ref 136–145)
TRIGL SERPL-MCNC: 97 MG/DL (ref 0–150)
TSH SERPL DL<=0.005 MIU/L-ACNC: 3.04 UIU/ML (ref 0.27–4.2)
VLDLC SERPL CALC-MCNC: 19 MG/DL
WBC # BLD AUTO: 6.5 K/UL (ref 4–11)

## 2024-12-17 RX ORDER — SIMVASTATIN 40 MG
TABLET ORAL
Qty: 90 TABLET | Refills: 3 | Status: SHIPPED | OUTPATIENT
Start: 2024-12-17

## 2024-12-17 NOTE — PROGRESS NOTES
Jorge Easton is a 74 y.o. male    Chief Complaint   Patient presents with    Medicare AWV    Hyperlipidemia       HPI:    Hyperlipidemia  This is a chronic problem. The current episode started more than 1 year ago. The problem is controlled. Recent lipid tests were reviewed and are normal. He has no history of diabetes. Pertinent negatives include no myalgias. Current antihyperlipidemic treatment includes statins. The current treatment provides significant improvement of lipids. There are no compliance problems.  Risk factors for coronary artery disease include male sex.       ROS:    Review of Systems   Musculoskeletal:  Negative for myalgias.       /72 (Site: Right Upper Arm, Position: Sitting, Cuff Size: Large Adult)   Pulse 63   Temp 96.9 °F (36.1 °C) (Temporal)   Ht 1.753 m (5' 9.02\")   Wt 106.6 kg (235 lb)   SpO2 97%   BMI 34.69 kg/m²     Physical Exam:    Physical Exam  Constitutional:       General: He is not in acute distress.     Appearance: Normal appearance. He is well-developed. He is obese. He is not ill-appearing, toxic-appearing or diaphoretic.   HENT:      Head: Normocephalic.   Cardiovascular:      Rate and Rhythm: Normal rate and regular rhythm.      Pulses: Normal pulses.      Heart sounds: No murmur heard.  Pulmonary:      Effort: Pulmonary effort is normal. No respiratory distress.      Breath sounds: Normal breath sounds. No wheezing.   Musculoskeletal:      Cervical back: Normal range of motion. No rigidity.   Lymphadenopathy:      Cervical: No cervical adenopathy.   Neurological:      Mental Status: He is alert.   Psychiatric:         Mood and Affect: Mood normal.         Behavior: Behavior normal.         Thought Content: Thought content normal.         Current Outpatient Medications   Medication Sig Dispense Refill    simvastatin (ZOCOR) 40 MG tablet TAKE 1 TABLET BY MOUTH EVERY EVENING 90 tablet 3    furosemide (LASIX) 20 MG tablet Take 1 tablet by mouth daily as needed (leg

## 2024-12-17 NOTE — PATIENT INSTRUCTIONS
secondhand smoke too.     Stay at a weight that's healthy for you. Talk to your doctor if you need help losing weight.     Try to get 7 to 9 hours of sleep each night.     Limit alcohol to 2 drinks a day for men and 1 drink a day for women. Too much alcohol can cause health problems.     Manage other health problems such as diabetes, high blood pressure, and high cholesterol. If you think you may have a problem with alcohol or drug use, talk to your doctor.   Medicines    Take your medicines exactly as prescribed. Call your doctor if you think you are having a problem with your medicine.     If your doctor recommends aspirin, take the amount directed each day. Make sure you take aspirin and not another kind of pain reliever, such as acetaminophen (Tylenol).   When should you call for help?   Call 911 if you have symptoms of a heart attack. These may include:    Chest pain or pressure, or a strange feeling in the chest.     Sweating.     Shortness of breath.     Pain, pressure, or a strange feeling in the back, neck, jaw, or upper belly or in one or both shoulders or arms.     Lightheadedness or sudden weakness.     A fast or irregular heartbeat.   After you call 911, the  may tell you to chew 1 adult-strength or 2 to 4 low-dose aspirin. Wait for an ambulance. Do not try to drive yourself.  Watch closely for changes in your health, and be sure to contact your doctor if you have any problems.  Where can you learn more?  Go to https://www.[employer]/patientEd and enter F075 to learn more about \"A Healthy Heart: Care Instructions.\"  Current as of: June 24, 2023  Content Version: 14.2  © 2024 [employer].   Care instructions adapted under license by [employer]. If you have questions about a medical condition or this instruction, always ask your healthcare professional. [employer] disclaims any warranty or liability for your use of this information.      Personalized Preventive Plan

## (undated) DEVICE — Z DISCONTINUED USE 2276105 GOWN PROTCT UNIV CHST W28IN L49IN SL 24IN BLU SPUNBOND FLM

## (undated) DEVICE — ELECTRODE ECG MONITR FOAM TEAR DROP ADLT RED

## (undated) DEVICE — AIRLIFE™ NASAL OXYGEN CANNULA CURVED, FLARED TIP, WITH 7 FEET (2.1 M) CRUSH RESISTANT TUBING, OVER-THE-EAR STYLE: Brand: AIRLIFE™

## (undated) DEVICE — ENDO CARRY-ON PROCEDURE KIT: Brand: ENDO CARRY-ON PROCEDURE KIT